# Patient Record
Sex: MALE | Race: WHITE | NOT HISPANIC OR LATINO | URBAN - METROPOLITAN AREA
[De-identification: names, ages, dates, MRNs, and addresses within clinical notes are randomized per-mention and may not be internally consistent; named-entity substitution may affect disease eponyms.]

---

## 2020-06-24 ENCOUNTER — INPATIENT (INPATIENT)
Facility: HOSPITAL | Age: 85
LOS: 0 days | Discharge: HOME | DRG: 312 | End: 2020-06-25
Attending: INTERNAL MEDICINE | Admitting: INTERNAL MEDICINE
Payer: MEDICARE

## 2020-06-24 VITALS
TEMPERATURE: 98 F | DIASTOLIC BLOOD PRESSURE: 83 MMHG | HEIGHT: 71 IN | HEART RATE: 60 BPM | OXYGEN SATURATION: 97 % | RESPIRATION RATE: 18 BRPM | WEIGHT: 169.98 LBS | SYSTOLIC BLOOD PRESSURE: 188 MMHG

## 2020-06-24 DIAGNOSIS — E03.9 HYPOTHYROIDISM, UNSPECIFIED: ICD-10-CM

## 2020-06-24 DIAGNOSIS — Z02.9 ENCOUNTER FOR ADMINISTRATIVE EXAMINATIONS, UNSPECIFIED: ICD-10-CM

## 2020-06-24 DIAGNOSIS — I45.10 UNSPECIFIED RIGHT BUNDLE-BRANCH BLOCK: ICD-10-CM

## 2020-06-24 DIAGNOSIS — I48.0 PAROXYSMAL ATRIAL FIBRILLATION: ICD-10-CM

## 2020-06-24 DIAGNOSIS — I10 ESSENTIAL (PRIMARY) HYPERTENSION: ICD-10-CM

## 2020-06-24 DIAGNOSIS — Z96.7 PRESENCE OF OTHER BONE AND TENDON IMPLANTS: Chronic | ICD-10-CM

## 2020-06-24 DIAGNOSIS — R55 SYNCOPE AND COLLAPSE: ICD-10-CM

## 2020-06-24 DIAGNOSIS — I50.9 HEART FAILURE, UNSPECIFIED: ICD-10-CM

## 2020-06-24 DIAGNOSIS — D50.9 IRON DEFICIENCY ANEMIA, UNSPECIFIED: ICD-10-CM

## 2020-06-24 LAB
ALBUMIN SERPL ELPH-MCNC: 3.9 G/DL — SIGNIFICANT CHANGE UP (ref 3.3–5)
ALP SERPL-CCNC: 82 U/L — SIGNIFICANT CHANGE UP (ref 40–120)
ALT FLD-CCNC: 21 U/L — SIGNIFICANT CHANGE UP (ref 10–45)
ANION GAP SERPL CALC-SCNC: 14 MMOL/L — SIGNIFICANT CHANGE UP (ref 5–17)
APPEARANCE UR: CLEAR — SIGNIFICANT CHANGE UP
APTT BLD: 44.5 SEC — HIGH (ref 27.5–36.3)
AST SERPL-CCNC: 27 U/L — SIGNIFICANT CHANGE UP (ref 10–40)
BASOPHILS # BLD AUTO: 0.04 K/UL — SIGNIFICANT CHANGE UP (ref 0–0.2)
BASOPHILS NFR BLD AUTO: 0.4 % — SIGNIFICANT CHANGE UP (ref 0–2)
BILIRUB SERPL-MCNC: 0.3 MG/DL — SIGNIFICANT CHANGE UP (ref 0.2–1.2)
BILIRUB UR-MCNC: NEGATIVE — SIGNIFICANT CHANGE UP
BUN SERPL-MCNC: 27 MG/DL — HIGH (ref 7–23)
CALCIUM SERPL-MCNC: 9.5 MG/DL — SIGNIFICANT CHANGE UP (ref 8.4–10.5)
CHLORIDE SERPL-SCNC: 100 MMOL/L — SIGNIFICANT CHANGE UP (ref 96–108)
CO2 SERPL-SCNC: 22 MMOL/L — SIGNIFICANT CHANGE UP (ref 22–31)
COLOR SPEC: SIGNIFICANT CHANGE UP
CREAT SERPL-MCNC: 1.36 MG/DL — HIGH (ref 0.5–1.3)
DIFF PNL FLD: NEGATIVE — SIGNIFICANT CHANGE UP
EOSINOPHIL # BLD AUTO: 0.31 K/UL — SIGNIFICANT CHANGE UP (ref 0–0.5)
EOSINOPHIL NFR BLD AUTO: 3.1 % — SIGNIFICANT CHANGE UP (ref 0–6)
GLUCOSE SERPL-MCNC: 99 MG/DL — SIGNIFICANT CHANGE UP (ref 70–99)
GLUCOSE UR QL: NEGATIVE — SIGNIFICANT CHANGE UP
HCT VFR BLD CALC: 40.4 % — SIGNIFICANT CHANGE UP (ref 39–50)
HGB BLD-MCNC: 13.4 G/DL — SIGNIFICANT CHANGE UP (ref 13–17)
IMM GRANULOCYTES NFR BLD AUTO: 0.7 % — SIGNIFICANT CHANGE UP (ref 0–1.5)
INR BLD: 1.05 RATIO — SIGNIFICANT CHANGE UP (ref 0.88–1.16)
KETONES UR-MCNC: NEGATIVE — SIGNIFICANT CHANGE UP
LACTATE SERPL-SCNC: 2.3 MMOL/L — HIGH (ref 0.7–2)
LEUKOCYTE ESTERASE UR-ACNC: NEGATIVE — SIGNIFICANT CHANGE UP
LYMPHOCYTES # BLD AUTO: 2.27 K/UL — SIGNIFICANT CHANGE UP (ref 1–3.3)
LYMPHOCYTES # BLD AUTO: 23 % — SIGNIFICANT CHANGE UP (ref 13–44)
MAGNESIUM SERPL-MCNC: 2 MG/DL — SIGNIFICANT CHANGE UP (ref 1.6–2.6)
MCHC RBC-ENTMCNC: 30.7 PG — SIGNIFICANT CHANGE UP (ref 27–34)
MCHC RBC-ENTMCNC: 33.2 GM/DL — SIGNIFICANT CHANGE UP (ref 32–36)
MCV RBC AUTO: 92.4 FL — SIGNIFICANT CHANGE UP (ref 80–100)
MONOCYTES # BLD AUTO: 1.01 K/UL — HIGH (ref 0–0.9)
MONOCYTES NFR BLD AUTO: 10.2 % — SIGNIFICANT CHANGE UP (ref 2–14)
NEUTROPHILS # BLD AUTO: 6.19 K/UL — SIGNIFICANT CHANGE UP (ref 1.8–7.4)
NEUTROPHILS NFR BLD AUTO: 62.6 % — SIGNIFICANT CHANGE UP (ref 43–77)
NITRITE UR-MCNC: NEGATIVE — SIGNIFICANT CHANGE UP
NRBC # BLD: 0 /100 WBCS — SIGNIFICANT CHANGE UP (ref 0–0)
NT-PROBNP SERPL-SCNC: 1625 PG/ML — HIGH (ref 0–300)
PH UR: 6.5 — SIGNIFICANT CHANGE UP (ref 5–8)
PHOSPHATE SERPL-MCNC: 3.8 MG/DL — SIGNIFICANT CHANGE UP (ref 2.5–4.5)
PLATELET # BLD AUTO: 235 K/UL — SIGNIFICANT CHANGE UP (ref 150–400)
POTASSIUM SERPL-MCNC: 3.6 MMOL/L — SIGNIFICANT CHANGE UP (ref 3.5–5.3)
POTASSIUM SERPL-SCNC: 3.6 MMOL/L — SIGNIFICANT CHANGE UP (ref 3.5–5.3)
PROT SERPL-MCNC: 7 G/DL — SIGNIFICANT CHANGE UP (ref 6–8.3)
PROT UR-MCNC: ABNORMAL
PROTHROM AB SERPL-ACNC: 12.1 SEC — SIGNIFICANT CHANGE UP (ref 10–12.9)
RBC # BLD: 4.37 M/UL — SIGNIFICANT CHANGE UP (ref 4.2–5.8)
RBC # FLD: 14.6 % — HIGH (ref 10.3–14.5)
SARS-COV-2 RNA SPEC QL NAA+PROBE: SIGNIFICANT CHANGE UP
SODIUM SERPL-SCNC: 136 MMOL/L — SIGNIFICANT CHANGE UP (ref 135–145)
SP GR SPEC: 1.01 — SIGNIFICANT CHANGE UP (ref 1.01–1.02)
TROPONIN T, HIGH SENSITIVITY RESULT: 39 NG/L — SIGNIFICANT CHANGE UP (ref 0–51)
TROPONIN T, HIGH SENSITIVITY RESULT: 39 NG/L — SIGNIFICANT CHANGE UP (ref 0–51)
UROBILINOGEN FLD QL: NEGATIVE — SIGNIFICANT CHANGE UP
WBC # BLD: 9.89 K/UL — SIGNIFICANT CHANGE UP (ref 3.8–10.5)
WBC # FLD AUTO: 9.89 K/UL — SIGNIFICANT CHANGE UP (ref 3.8–10.5)

## 2020-06-24 PROCEDURE — 71045 X-RAY EXAM CHEST 1 VIEW: CPT | Mod: 26

## 2020-06-24 PROCEDURE — 72170 X-RAY EXAM OF PELVIS: CPT | Mod: 26

## 2020-06-24 PROCEDURE — 99358 PROLONG SERVICE W/O CONTACT: CPT

## 2020-06-24 PROCEDURE — 70450 CT HEAD/BRAIN W/O DYE: CPT | Mod: 26

## 2020-06-24 PROCEDURE — 99285 EMERGENCY DEPT VISIT HI MDM: CPT | Mod: CS,25,GC

## 2020-06-24 PROCEDURE — 93010 ELECTROCARDIOGRAM REPORT: CPT | Mod: 59,GC

## 2020-06-24 PROCEDURE — 12002 RPR S/N/AX/GEN/TRNK2.6-7.5CM: CPT | Mod: GC

## 2020-06-24 PROCEDURE — 99223 1ST HOSP IP/OBS HIGH 75: CPT

## 2020-06-24 RX ORDER — TETANUS TOXOID, REDUCED DIPHTHERIA TOXOID AND ACELLULAR PERTUSSIS VACCINE, ADSORBED 5; 2.5; 8; 8; 2.5 [IU]/.5ML; [IU]/.5ML; UG/.5ML; UG/.5ML; UG/.5ML
0.5 SUSPENSION INTRAMUSCULAR ONCE
Refills: 0 | Status: COMPLETED | OUTPATIENT
Start: 2020-06-24 | End: 2020-06-24

## 2020-06-24 RX ORDER — PANTOPRAZOLE SODIUM 20 MG/1
40 TABLET, DELAYED RELEASE ORAL
Refills: 0 | Status: DISCONTINUED | OUTPATIENT
Start: 2020-06-25 | End: 2020-06-25

## 2020-06-24 RX ORDER — SIMVASTATIN 20 MG/1
1 TABLET, FILM COATED ORAL
Qty: 0 | Refills: 0 | DISCHARGE

## 2020-06-24 RX ORDER — PANTOPRAZOLE SODIUM 20 MG/1
1 TABLET, DELAYED RELEASE ORAL
Qty: 0 | Refills: 0 | DISCHARGE

## 2020-06-24 RX ORDER — FUROSEMIDE 40 MG
40 TABLET ORAL DAILY
Refills: 0 | Status: DISCONTINUED | OUTPATIENT
Start: 2020-06-25 | End: 2020-06-25

## 2020-06-24 RX ORDER — FERROUS SULFATE 325(65) MG
1 TABLET ORAL
Qty: 0 | Refills: 0 | DISCHARGE

## 2020-06-24 RX ORDER — DIGOXIN 250 MCG
0.12 TABLET ORAL DAILY
Refills: 0 | Status: DISCONTINUED | OUTPATIENT
Start: 2020-06-25 | End: 2020-06-25

## 2020-06-24 RX ORDER — SERTRALINE 25 MG/1
1 TABLET, FILM COATED ORAL
Qty: 0 | Refills: 0 | DISCHARGE

## 2020-06-24 RX ORDER — SERTRALINE 25 MG/1
100 TABLET, FILM COATED ORAL DAILY
Refills: 0 | Status: DISCONTINUED | OUTPATIENT
Start: 2020-06-24 | End: 2020-06-25

## 2020-06-24 RX ORDER — ISOSORBIDE MONONITRATE 60 MG/1
60 TABLET, EXTENDED RELEASE ORAL DAILY
Refills: 0 | Status: DISCONTINUED | OUTPATIENT
Start: 2020-06-25 | End: 2020-06-25

## 2020-06-24 RX ORDER — DILTIAZEM HCL 120 MG
120 CAPSULE, EXT RELEASE 24 HR ORAL DAILY
Refills: 0 | Status: DISCONTINUED | OUTPATIENT
Start: 2020-06-25 | End: 2020-06-25

## 2020-06-24 RX ORDER — LIDOCAINE/EPINEPHR/TETRACAINE 4-0.09-0.5
1 GEL WITH PREFILLED APPLICATOR (ML) TOPICAL ONCE
Refills: 0 | Status: COMPLETED | OUTPATIENT
Start: 2020-06-24 | End: 2020-06-24

## 2020-06-24 RX ORDER — FERROUS SULFATE 325(65) MG
325 TABLET ORAL DAILY
Refills: 0 | Status: DISCONTINUED | OUTPATIENT
Start: 2020-06-24 | End: 2020-06-25

## 2020-06-24 RX ORDER — SIMVASTATIN 20 MG/1
10 TABLET, FILM COATED ORAL AT BEDTIME
Refills: 0 | Status: DISCONTINUED | OUTPATIENT
Start: 2020-06-24 | End: 2020-06-25

## 2020-06-24 RX ORDER — HYDRALAZINE HCL 50 MG
50 TABLET ORAL
Refills: 0 | Status: DISCONTINUED | OUTPATIENT
Start: 2020-06-24 | End: 2020-06-25

## 2020-06-24 RX ORDER — LEVOTHYROXINE SODIUM 125 MCG
100 TABLET ORAL DAILY
Refills: 0 | Status: DISCONTINUED | OUTPATIENT
Start: 2020-06-25 | End: 2020-06-25

## 2020-06-24 RX ORDER — LEVOTHYROXINE SODIUM 125 MCG
1 TABLET ORAL
Qty: 0 | Refills: 0 | DISCHARGE

## 2020-06-24 RX ORDER — IRBESARTAN 75 MG/1
1 TABLET ORAL
Qty: 0 | Refills: 0 | DISCHARGE

## 2020-06-24 RX ORDER — DABIGATRAN ETEXILATE MESYLATE 150 MG/1
1 CAPSULE ORAL
Qty: 0 | Refills: 0 | DISCHARGE

## 2020-06-24 RX ORDER — LOSARTAN POTASSIUM 100 MG/1
100 TABLET, FILM COATED ORAL DAILY
Refills: 0 | Status: DISCONTINUED | OUTPATIENT
Start: 2020-06-25 | End: 2020-06-25

## 2020-06-24 RX ADMIN — Medication 50 MILLIGRAM(S): at 22:47

## 2020-06-24 RX ADMIN — TETANUS TOXOID, REDUCED DIPHTHERIA TOXOID AND ACELLULAR PERTUSSIS VACCINE, ADSORBED 0.5 MILLILITER(S): 5; 2.5; 8; 8; 2.5 SUSPENSION INTRAMUSCULAR at 21:18

## 2020-06-24 RX ADMIN — Medication 1 APPLICATION(S): at 21:18

## 2020-06-24 NOTE — ED PROVIDER NOTE - PHYSICAL EXAMINATION
General: well appearing, interactive, well nourished, NAD  HEENT: pupils equal and reactive, normal external ears bilaterally   Cardiac: RRR, no MRG appreciated  Scalp: Posterior scalp lac 5x5 cm, galea intact  Resp: lungs clear to auscultation bilaterally, symmetric chest wall rise  Abd: soft, nontender, nondistended,   : no CVA tenderness  Neuro: Moving all extremities, 5/5 str in UE and LE   Skin:  normal color for race  MSK: no tenderness with full active and passive ROM of BL shoulders/elbows/knees/hips/ankles,

## 2020-06-24 NOTE — ED PROVIDER NOTE - NS ED ROS FT
CONSTITUTIONAL: No fevers, no chills  Eyes: No vision changes  Cardiovascular: No Chest pain  Respiratory: No SOB  Gastrointestinal: No n/v/d, no abd pain  Genitourinary: no dysuria, no hematuria  SKIN: no rashes.  NEURO: no weakness or numbness  PSYCHIATRIC: no known mental health issues, alert and oriented x 3 to self/place/time

## 2020-06-24 NOTE — H&P ADULT - PROBLEM SELECTOR PLAN 3
c/w tele monitoring  c/w digoxin and diltiazem home dosing  HOLD dabigatran overnight given scalp hematoma. Consideration to resume tomorrow or next day per day hospitalist.

## 2020-06-24 NOTE — H&P ADULT - PROBLEM SELECTOR PLAN 1
- CT head per rads without acute ICH  - troponin delta not c/w ACS  - EKG with RBBB- unclear if this is new and will need day hospitalist to f/u  - BNP elevated but on exam not features of acute decompensated heart failure  - scalp lac repair per ED  - tele monitoring, TTE in am to evaluate for valvulopathy and EF given HF  - unclear if labile BP reported is contributory  - no acute hemorrhage on exam appreciated  - no acute paralysis appreciated to suggest CVA and CT head per rads without acute pathology detected  - PT ordered

## 2020-06-24 NOTE — H&P ADULT - ATTENDING COMMENTS
Patient assigned to me by night hospitalist in charge for management and care for patient for this evening only. Care to be resumed by day hospitalist Dr. Jonathan Anthony at 08:00 in the morning and thereafter.     Jesse Ulloa MD  Medicine Attending  Department of Hospital Medicine  pager: [phone] (available from 20:00 to 08:00) Patient assigned to me by night hospitalist in charge for management and care for patient for this evening only. Care to be resumed by day hospitalist Dr. Jonathan Anthony at 08:00 in the morning and thereafter.     I personally provided 35 minutes of prolonged services without face-to-face contact on 6/24/20 from 09:30PM to 10:05PM. This was medically required due to syncope and patient being unable to provide full history regarding his fall, his full pmh and medications which required detailed chart review available on EMR and conversation with his daughter Ms. Thais Ward. Ms. aWrd lives with the patient and was present at time of fall however did not witness the event but shortly after was with patient. She requests that she be contacted regarding her fathers care and indicates that she is the point person for the family should the medical team need to reach her. She is very knowledgeable of the patient medical history and provided.    Billing code: [id number]    Jesse Ulloa MD  Medicine Attending  Department of Hospital Medicine  pager: [phone] (available from 20:00 to 08:00)

## 2020-06-24 NOTE — H&P ADULT - PROBLEM SELECTOR PLAN 2
- c/w home furosemide, hydralazine, irbesartan, isosorbide mononitrate  - TTE ordered  - Tele monitoring  - ordered TSH, lactate  - Troponin delta not c/w ACS and NO CP reported by patient

## 2020-06-24 NOTE — H&P ADULT - PROBLEM SELECTOR PLAN 6
c/w iron supplementation  no anemia on admit labs and therefore successfully corrected by outpatient supplementation

## 2020-06-24 NOTE — ED PROVIDER NOTE - OBJECTIVE STATEMENT
89yo M pmhx afib on Pradexa, CAD here w/ CC Syncope    Pt fell while using walker, state "just went down" and does not know why, states has had happened 4 times in the past two months. Does not know why he takes furosemide or digoxin, states on hx of stents. Denies CP/SOB. Denies pain to area other then scalp. No N/V/D, no headache.     Unknown last Tetanus 89yo M pmhx afib on Pradexa, CAD here w/ CC Syncope    Pt fell while using walker, state "just went down" and does not know why, states has had happened 4 times in the past two months. Does not know why he takes furosemide or digoxin, states on hx of stents. Denies CP/SOB. Denies pain to area other then scalp. No N/V/D, no headache.     Social: Lives with wife at home  Unknown last Tetanus 89yo M pmhx afib on Pradexa, CHF, CAD here w/ CC Syncope    Pt fell while using walker, state "just went down" and does not know why, states has had happened 4 times in the past two months. Does not know why he takes furosemide or digoxin, states on hx of stents. Denies CP/SOB. Denies pain to area other then scalp. No N/V/D, no headache.     Social: Lives with wife at home  Unknown last Tetanus

## 2020-06-24 NOTE — ED PROVIDER NOTE - PROGRESS NOTE DETAILS
[facility] PGY-2:  D/W PCP Dr. Raj Castillo (# [phone]), states pt has pmhx of atrial bradycardia, CHF, afib , agrees with admission.   At baseline has ST depressions throughout pre-cordium, RBBB, AVR elevation, tachy-bradia syndrome.  Will admit pt to self

## 2020-06-24 NOTE — H&P ADULT - NSHPPHYSICALEXAM_GEN_ALL_CORE
Vital Signs Last 24 Hrs  T(C): 36.9 (24 Jun 2020 22:09), Max: 36.9 (24 Jun 2020 19:50)  T(F): 98.4 (24 Jun 2020 22:09), Max: 98.4 (24 Jun 2020 19:50)  HR: 67 (24 Jun 2020 22:09) (60 - 67)  BP: 196/86 (24 Jun 2020 22:09) (188/83 - 196/86)  RR: 18 (24 Jun 2020 22:09) (18 - 18)  SpO2: 100% (24 Jun 2020 22:09) (97% - 100%) on RA    GENERAL: NAD, well-developed  HEAD:  laceration on back of hear, Normocephalic  EYES: EOMI, PERRL, conjunctiva and sclera clear  Mouth: MMM, no lesions  NECK: Supple, no appreciable masses  Lung: normal work of breathing, cta b/l  Chest: S1&S2+, rrr, no m/r/g appreciated  ABDOMEN: bs+, soft, nt, nd, no appreciable masses  : No conn catheter, no CVA tenderness  EXTREMITIES:  radial pulse present b/l, PT present b/l, no pitting edema present  Neuro: A&Ox3, no paralysis in extremities appreciated  SKIN: warm and dry, no visible purulence in exposed areas

## 2020-06-24 NOTE — ED ADULT NURSE NOTE - OBJECTIVE STATEMENT
89 year old male with a PMH afib on pradaxa, CHF and CAD comes to the ED s/p fall/syncope a/w head injury/potential LOC. Patient states he was walking in home with assistance of his walker when he had fallen back wards. Patient unsure how he fell and states, "next thing I remember was my daughter helping me up." Per EMS, this is the 5th fall patient has had within the last 4 months. Patient presents to the ED a/ox3, hypertensive, other VSS, sensory/motor function intact, speaking coherently, follows commands and in NAD at this time. Patient p/w laceration to the posterior aspect of scalp, with minimal bleeding present. Lung sounds are clear and equal b/l with no labored breathing noted. Abdomen is soft, nontender and nondistended. Peripheral pulses are strong and equal b/l with no edema noted. Skin is warm, dry and intact. Patient denies CP/SOB, f/c, n/v/d, dizziness/lightheadedness, numbness/tingling/weakness, abdominal pain/back pain, heamturia/dysuria/frequency at this time. Patient placed on CM p/w NSR, EKG performed, 20G IV placed in left forearm, labs sent. MD Jackson seen at patient's bedside for evaluation.

## 2020-06-24 NOTE — H&P ADULT - PROBLEM SELECTOR PLAN 8
Transitions of Care Status:  1.  Name of PCP: Dr. Anthony  2.  PCP Contacted on Admission: [ ] Y    [ ] N    3.  PCP contacted at Discharge: [ ] Y    [ ] N    [ ] N/A  4.  Post-Discharge Appointment Date and Location:  5.  Summary of Handoff given to PCP:

## 2020-06-24 NOTE — H&P ADULT - NSICDXPASTMEDICALHX_GEN_ALL_CORE_FT
PAST MEDICAL HISTORY:  Atrial fibrillation     Benign Essential Hypertension     CAD (coronary artery disease)     Heart failure

## 2020-06-24 NOTE — ED PROVIDER NOTE - ATTENDING CONTRIBUTION TO CARE
I performed a history and physical exam of the patient and discussed their management with the resident.  I reviewed the resident's note and agree with the documented findings and plan of care except as noted below. My medical decision making and observations are as follows:    89yo M pmhx afib on Pradexa, CAD BIBEMS after fall. Pt fell while using walker, state "just went down" and does not know why, states has had happened 4 times in the past two months.  reports minimal pain to scalp, denies any other pain.  No chest pain, shortness of breath, dizziness prior to fall.  Pt A&O x 3, heart rrr, lungs cta, no midline vertebral ttp, v-shaped laceration to left occiput, no neuro deficits, Full ROM all extremities.  Concern for possible intracranial bleed given head trauma and pt's pradaxa use.  Also concerning hx of more frequent falls - unclear if these are syncopal episodes or mechanical in nature.  Pt needs labs, CTs, xrays, and likely admission for syncope work up. Fusiform Excision Additional Text (Leave Blank If You Do Not Want): The margin was drawn around the clinically apparent lesion.  A fusiform shape was then drawn on the skin incorporating the lesion and margins.  Incisions were then made along these lines to the appropriate tissue plane and the lesion was extirpated.

## 2020-06-24 NOTE — H&P ADULT - NSHPLABSRESULTS_GEN_ALL_CORE
Personally reviewed available labs, imaging and ekg  CBC Full  -  ( 24 Jun 2020 20:29 )  WBC Count : 9.89 K/uL  RBC Count : 4.37 M/uL  Hemoglobin : 13.4 g/dL  Hematocrit : 40.4 %  Platelet Count - Automated : 235 K/uL  Mean Cell Volume : 92.4 fl  Mean Cell Hemoglobin : 30.7 pg  Mean Cell Hemoglobin Concentration : 33.2 gm/dL  Auto Neutrophil # : 6.19 K/uL  Auto Lymphocyte # : 2.27 K/uL  Auto Monocyte # : 1.01 K/uL  Auto Eosinophil # : 0.31 K/uL  Auto Basophil # : 0.04 K/uL  Auto Neutrophil % : 62.6 %  Auto Lymphocyte % : 23.0 %  Auto Monocyte % : 10.2 %  Auto Eosinophil % : 3.1 %  Auto Basophil % : 0.4 %  06-24  136  |  100  |  27<H>  ----------------------------<  99  3.6   |  22  |  1.36<H>  Ca    9.5      24 Jun 2020 20:29  Phos  3.8     06-24  Mg     2.0     06-24  TPro  7.0  /  Alb  3.9  /  TBili  0.3  /  DBili  x   /  AST  27  /  ALT  21  /  AlkPhos  82  06-24  PT/INR - ( 24 Jun 2020 20:29 )   PT: 12.1 sec;   INR: 1.05 ratio    PTT - ( 24 Jun 2020 20:29 )  PTT:44.5 sec  Troponin T, High Sensitivity Result: 39:  (06.24.20 @ 20:29)  Troponin T, High Sensitivity Result: 39:  (06.24.20 @ 21:49)  Serum Pro-Brain Natriuretic Peptide: 1625 pg/mL (06.24.20 @ 20:29)    Imaging: CXR does not demonstrate lobar opacification  CT head does not demonstrate large territorial acute intracranial hemorrhage, scalp hematoma  EKG: sinus bradycardia 59, RBBB, NO STEMI appreciated

## 2020-06-24 NOTE — H&P ADULT - NSHPREVIEWOFSYSTEMS_GEN_ALL_CORE
CONSTITUTIONAL: No fever, no chills  EYES: No eye pain, no acute blindness  Mouth: no pain in mouth, no cuts  RESPIRATORY: No cough, No sob  CARDIOVASCULAR: No CP, no palpitations  GASTROINTESTINAL: no abdominal pain, no n/v/d  GENITOURINARY: No dysuria, no hematuria  Heme: No easy bruising, no swelling of neck  NEUROLOGICAL: No seizure, No acute paralysis, fall+  SKIN: No itching, no rashes  MUSCULOSKELETAL: No acute joint pain, no joint swelling

## 2020-06-24 NOTE — H&P ADULT - HISTORY OF PRESENT ILLNESS
patient evaluated at bedside. syncope eval. full H&P eval 89M w/ HF, CAD, afib on pradaxa, HTN, iron deficiency anemia presents to [facility] for evaluation following fall. Patient reports a fall while walking with walker to sit down in next room for dinner but he cannot remember the mechanism. When he fell he hit his head on carpeted stairs and had bleeding from the back of his head but denies LOC, weakness or paralysis in extremities, seizure or tongue biting. He further denies CP, palpitations, sob, cough, incontinence following fall, fever, chills, or poor po intake. Per conversation with his daughter Ms. Thais Ward, the fall was not witnessed but she was in the next room and heard a "thump" and immediately went to see him and said he did not appear to have LOC, seizure, or acute distress and called EMS due to the head bleeding. She further reports that he maintains all ADL and is generally well but over the last 4mo or so has had 5 falls including the current episode and believes he is generally becoming weaker and his legs are not able to keep him up (concerned that this maybe related to his heart failure but denies lower extremity swelling or CP/SOB or weight changes). Of note he also has been recently treated for iron deficiency anemia over the last few months and had recent EGD within 6mo which was normal and colonoscopy 2 yr prior which was also reported as normal. Last, she points out that he has had labile blood pressure for some time and they often have to call his cardiologist to determine when additional hydralazine dosing should be provided.    In the ED, VS 98.4, 188/83, 60, 18 97%RA  s/p Adacel vaccine

## 2020-06-24 NOTE — ED ADULT TRIAGE NOTE - BP NONINVASIVE SYSTOLIC (MM HG)
[facility] 188

## 2020-06-24 NOTE — H&P ADULT - ASSESSMENT
89M w/ HF, CAD, afib on pradaxa, HTN, iron deficiency anemia presents to [facility] for evaluation following fall requiring admit for syncope evaluation.

## 2020-06-25 ENCOUNTER — TRANSCRIPTION ENCOUNTER (OUTPATIENT)
Age: 85
End: 2020-06-25

## 2020-06-25 VITALS — HEART RATE: 76 BPM | SYSTOLIC BLOOD PRESSURE: 148 MMHG | DIASTOLIC BLOOD PRESSURE: 80 MMHG

## 2020-06-25 LAB
ANION GAP SERPL CALC-SCNC: 11 MMOL/L — SIGNIFICANT CHANGE UP (ref 5–17)
APTT BLD: 31.6 SEC — SIGNIFICANT CHANGE UP (ref 27.5–36.3)
BUN SERPL-MCNC: 22 MG/DL — SIGNIFICANT CHANGE UP (ref 7–23)
CALCIUM SERPL-MCNC: 8.6 MG/DL — SIGNIFICANT CHANGE UP (ref 8.4–10.5)
CHLORIDE SERPL-SCNC: 101 MMOL/L — SIGNIFICANT CHANGE UP (ref 96–108)
CO2 SERPL-SCNC: 25 MMOL/L — SIGNIFICANT CHANGE UP (ref 22–31)
CREAT SERPL-MCNC: 1.31 MG/DL — HIGH (ref 0.5–1.3)
DIGOXIN SERPL-MCNC: 1.7 NG/ML — SIGNIFICANT CHANGE UP (ref 0.8–2)
FERRITIN SERPL-MCNC: 69 NG/ML — SIGNIFICANT CHANGE UP (ref 30–400)
FOLATE SERPL-MCNC: 9.1 NG/ML — SIGNIFICANT CHANGE UP
GLUCOSE SERPL-MCNC: 105 MG/DL — HIGH (ref 70–99)
HCT VFR BLD CALC: 36.7 % — LOW (ref 39–50)
HGB BLD-MCNC: 12.2 G/DL — LOW (ref 13–17)
INR BLD: 1.11 RATIO — SIGNIFICANT CHANGE UP (ref 0.88–1.16)
IRON SATN MFR SERPL: 31 % — SIGNIFICANT CHANGE UP (ref 16–55)
IRON SATN MFR SERPL: 75 UG/DL — SIGNIFICANT CHANGE UP (ref 45–165)
LACTATE SERPL-SCNC: 0.8 MMOL/L — SIGNIFICANT CHANGE UP (ref 0.7–2)
MAGNESIUM SERPL-MCNC: 1.8 MG/DL — SIGNIFICANT CHANGE UP (ref 1.6–2.6)
MCHC RBC-ENTMCNC: 30.9 PG — SIGNIFICANT CHANGE UP (ref 27–34)
MCHC RBC-ENTMCNC: 33.2 GM/DL — SIGNIFICANT CHANGE UP (ref 32–36)
MCV RBC AUTO: 92.9 FL — SIGNIFICANT CHANGE UP (ref 80–100)
NRBC # BLD: 0 /100 WBCS — SIGNIFICANT CHANGE UP (ref 0–0)
PHOSPHATE SERPL-MCNC: 3.7 MG/DL — SIGNIFICANT CHANGE UP (ref 2.5–4.5)
PLATELET # BLD AUTO: 202 K/UL — SIGNIFICANT CHANGE UP (ref 150–400)
POTASSIUM SERPL-MCNC: 3.9 MMOL/L — SIGNIFICANT CHANGE UP (ref 3.5–5.3)
POTASSIUM SERPL-SCNC: 3.9 MMOL/L — SIGNIFICANT CHANGE UP (ref 3.5–5.3)
PROTHROM AB SERPL-ACNC: 12.8 SEC — SIGNIFICANT CHANGE UP (ref 10–12.9)
RBC # BLD: 3.95 M/UL — LOW (ref 4.2–5.8)
RBC # FLD: 14.8 % — HIGH (ref 10.3–14.5)
SODIUM SERPL-SCNC: 137 MMOL/L — SIGNIFICANT CHANGE UP (ref 135–145)
TIBC SERPL-MCNC: 241 UG/DL — SIGNIFICANT CHANGE UP (ref 220–430)
TSH SERPL-MCNC: 3.87 UIU/ML — SIGNIFICANT CHANGE UP (ref 0.27–4.2)
UIBC SERPL-MCNC: 166 UG/DL — SIGNIFICANT CHANGE UP (ref 110–370)
VIT B12 SERPL-MCNC: 308 PG/ML — SIGNIFICANT CHANGE UP (ref 232–1245)
WBC # BLD: 10.28 K/UL — SIGNIFICANT CHANGE UP (ref 3.8–10.5)
WBC # FLD AUTO: 10.28 K/UL — SIGNIFICANT CHANGE UP (ref 3.8–10.5)

## 2020-06-25 PROCEDURE — 83540 ASSAY OF IRON: CPT

## 2020-06-25 PROCEDURE — 83550 IRON BINDING TEST: CPT

## 2020-06-25 PROCEDURE — 82746 ASSAY OF FOLIC ACID SERUM: CPT

## 2020-06-25 PROCEDURE — 82728 ASSAY OF FERRITIN: CPT

## 2020-06-25 PROCEDURE — 84443 ASSAY THYROID STIM HORMONE: CPT

## 2020-06-25 PROCEDURE — 80048 BASIC METABOLIC PNL TOTAL CA: CPT

## 2020-06-25 PROCEDURE — 99285 EMERGENCY DEPT VISIT HI MDM: CPT | Mod: 25

## 2020-06-25 PROCEDURE — 83605 ASSAY OF LACTIC ACID: CPT

## 2020-06-25 PROCEDURE — 80053 COMPREHEN METABOLIC PANEL: CPT

## 2020-06-25 PROCEDURE — 83735 ASSAY OF MAGNESIUM: CPT

## 2020-06-25 PROCEDURE — 87086 URINE CULTURE/COLONY COUNT: CPT

## 2020-06-25 PROCEDURE — 90715 TDAP VACCINE 7 YRS/> IM: CPT

## 2020-06-25 PROCEDURE — 93880 EXTRACRANIAL BILAT STUDY: CPT

## 2020-06-25 PROCEDURE — 80162 ASSAY OF DIGOXIN TOTAL: CPT

## 2020-06-25 PROCEDURE — 93306 TTE W/DOPPLER COMPLETE: CPT

## 2020-06-25 PROCEDURE — 81001 URINALYSIS AUTO W/SCOPE: CPT

## 2020-06-25 PROCEDURE — 36415 COLL VENOUS BLD VENIPUNCTURE: CPT

## 2020-06-25 PROCEDURE — 93306 TTE W/DOPPLER COMPLETE: CPT | Mod: 26

## 2020-06-25 PROCEDURE — 97162 PT EVAL MOD COMPLEX 30 MIN: CPT

## 2020-06-25 PROCEDURE — 87186 SC STD MICRODIL/AGAR DIL: CPT

## 2020-06-25 PROCEDURE — 85610 PROTHROMBIN TIME: CPT

## 2020-06-25 PROCEDURE — 71045 X-RAY EXAM CHEST 1 VIEW: CPT

## 2020-06-25 PROCEDURE — 83880 ASSAY OF NATRIURETIC PEPTIDE: CPT

## 2020-06-25 PROCEDURE — 93005 ELECTROCARDIOGRAM TRACING: CPT | Mod: XU

## 2020-06-25 PROCEDURE — 72170 X-RAY EXAM OF PELVIS: CPT

## 2020-06-25 PROCEDURE — 84100 ASSAY OF PHOSPHORUS: CPT

## 2020-06-25 PROCEDURE — 85027 COMPLETE CBC AUTOMATED: CPT

## 2020-06-25 PROCEDURE — 84484 ASSAY OF TROPONIN QUANT: CPT

## 2020-06-25 PROCEDURE — 93880 EXTRACRANIAL BILAT STUDY: CPT | Mod: 26

## 2020-06-25 PROCEDURE — 82607 VITAMIN B-12: CPT

## 2020-06-25 PROCEDURE — 12002 RPR S/N/AX/GEN/TRNK2.6-7.5CM: CPT

## 2020-06-25 PROCEDURE — 70450 CT HEAD/BRAIN W/O DYE: CPT

## 2020-06-25 PROCEDURE — 85730 THROMBOPLASTIN TIME PARTIAL: CPT

## 2020-06-25 RX ORDER — ZOLPIDEM TARTRATE 10 MG/1
1 TABLET ORAL
Qty: 0 | Refills: 0 | DISCHARGE

## 2020-06-25 RX ORDER — FUROSEMIDE 40 MG
1 TABLET ORAL
Qty: 0 | Refills: 0 | DISCHARGE

## 2020-06-25 RX ADMIN — PANTOPRAZOLE SODIUM 40 MILLIGRAM(S): 20 TABLET, DELAYED RELEASE ORAL at 06:07

## 2020-06-25 RX ADMIN — ISOSORBIDE MONONITRATE 60 MILLIGRAM(S): 60 TABLET, EXTENDED RELEASE ORAL at 12:55

## 2020-06-25 RX ADMIN — LOSARTAN POTASSIUM 100 MILLIGRAM(S): 100 TABLET, FILM COATED ORAL at 00:57

## 2020-06-25 RX ADMIN — Medication 0.12 MILLIGRAM(S): at 06:08

## 2020-06-25 RX ADMIN — Medication 50 MILLIGRAM(S): at 06:07

## 2020-06-25 RX ADMIN — Medication 100 MICROGRAM(S): at 06:07

## 2020-06-25 RX ADMIN — Medication 40 MILLIGRAM(S): at 06:08

## 2020-06-25 RX ADMIN — SERTRALINE 100 MILLIGRAM(S): 25 TABLET, FILM COATED ORAL at 12:55

## 2020-06-25 RX ADMIN — Medication 50 MILLIGRAM(S): at 12:55

## 2020-06-25 RX ADMIN — Medication 50 MILLIGRAM(S): at 17:09

## 2020-06-25 RX ADMIN — Medication 325 MILLIGRAM(S): at 12:55

## 2020-06-25 NOTE — PROGRESS NOTE ADULT - PROBLEM SELECTOR PLAN 1
cardiology consult - outpatient cardiologist Dr Esteban  check TTE  monitor on tele  carotid US  TTE  orthostatics  check digoxin level  trop negative

## 2020-06-25 NOTE — PHYSICAL THERAPY INITIAL EVALUATION ADULT - PERTINENT HX OF CURRENT PROBLEM, REHAB EVAL
89M w/ HF, CAD, afib on pradaxa, HTN, iron deficiency anemia presents to [facility] for evaluation following fall. Patient reports a fall while walking with walker to sit down in next room for dinner but he cannot remember the mechanism. When he fell he hit his head on carpeted stairs and had bleeding from the back of his head but denies LOC, weakness or paralysis in extremities contd below:

## 2020-06-25 NOTE — PHYSICAL THERAPY INITIAL EVALUATION ADULT - CRITERIA FOR SKILLED THERAPEUTIC INTERVENTIONS
therapy frequency/predicted duration of therapy intervention/anticipated equipment needs at discharge/anticipated discharge recommendation/risk reduction/prevention/impairments found/functional limitations in following categories

## 2020-06-25 NOTE — PROGRESS NOTE ADULT - ATTENDING COMMENTS
wound care for scalp wound    admit to medicine  Full Code  will follow as his PMD  PT consult    will discuss with daughter    Jonathan Anthony,   internal medicine

## 2020-06-25 NOTE — CHART NOTE - NSCHARTNOTEFT_GEN_A_CORE
BRENNON JONES    Spoke with Dr Rodas regarding pt's echo, which shows a collapsable IVC, which is indicative of volume depletion. Patient was admitted for syncope and collapse with laceration to the left occipital region. Patient is also orthostatic positive,  while lying and  while standing. Patient will require IVF hydration and BP monitoring. Patient's daughter, Thais prefers to take patient home without volume repletion and monitoring. Attending Dr Anthony had full conversation with pt's daughter, Thais, regarding risk vs benefit of taking patient home without IVF in the setting of orthstatis. Daughter understands the risk and will take patient home and encourage PO hydar      Vital Signs Last 24 Hrs  T(C): 36.7 (25 Jun 2020 12:26), Max: 37.2 (24 Jun 2020 23:25)  T(F): 98 (25 Jun 2020 12:26), Max: 98.9 (24 Jun 2020 23:25)  HR: 76 (25 Jun 2020 16:47) (56 - 76)  BP: 148/80 (25 Jun 2020 16:47) (148/80 - 210/96)  BP(mean): --  RR: 18 (25 Jun 2020 12:26) (17 - 18)  SpO2: 96% (25 Jun 2020 12:26) (96% - 100%)                      Kia Lama

## 2020-06-25 NOTE — DISCHARGE NOTE PROVIDER - NSDCMRMEDTOKEN_GEN_ALL_CORE_FT
digoxin 125 mcg (0.125 mg) oral tablet: 1 tab(s) orally once a day  dilTIAZem 120 mg/24 hours oral capsule, extended release: 1 cap(s) orally once a day  ferrous sulfate 325 mg (65 mg elemental iron) oral tablet: 1 tab(s) orally once a day  hydrALAZINE 50 mg oral tablet: 1 tab(s) orally 4 times a day  irbesartan 300 mg oral tablet: 1 tab(s) orally once a day  isosorbide mononitrate 60 mg oral tablet, extended release: 1 tab(s) orally once a day (in the morning)  levothyroxine 100 mcg (0.1 mg) oral tablet: 1 tab(s) orally once a day  pantoprazole 40 mg oral delayed release tablet: 1 tab(s) orally once a day  Pradaxa 75 mg oral capsule: 1 cap(s) orally 2 times a day  sertraline 100 mg oral tablet: 1 tab(s) orally once a day  simvastatin 10 mg oral tablet: 1 tab(s) orally once a day (at bedtime)  Vitamin D3 5000 intl units (125 mcg) oral tablet: 1 tab(s) orally once a week (Mondays)

## 2020-06-25 NOTE — PROGRESS NOTE ADULT - SUBJECTIVE AND OBJECTIVE BOX
Chief complaint: syncope    HPI:  89M w/ HF, CAD, afib on pradaxa, HTN, iron deficiency anemia presents to [facility] for syncope at home./ states he was readinf newspaper when his daughter called him to the other room. He got up and was walking to the next room when he suddenly fell. unclear if LOC. denies palpitatons or dizziness prior to the fall. No prodromal symptoms. no seizure like activity. No SOB. no CP. no palpitations. no leg swelling as of late. no recent changes to his meds. FOllows with Dr Esteban at [facility] for cardiology. No recent illness. at this time, states only symptom is pain on back of scalp at area of laceration. had lac repaired in ED. no fever or chills     In the ED, VS 98.4, 188/83, 60, 18 97%RA  s/p Adacel vaccine (2020 22:19)      PAST MEDICAL & SURGICAL HISTORY:  CAD (coronary artery disease)  Heart failure  Atrial fibrillation  Benign Essential Hypertension  Fixation hardware in spine      REVIEW OF SYSTEMS:    CONSTITUTIONAL: No fever, weight loss, or fatigue  NECK: No pain or stiffness  RESPIRATORY: No cough, wheezing, chills or hemoptysis; No shortness of breath  CARDIOVASCULAR: No chest pain, palpitations, dizziness, or leg swelling  GASTROINTESTINAL: No abdominal or epigastric pain. No nausea, vomiting, or hematemesis; No diarrhea or constipation. No melena or hematochezia.  GENITOURINARY: No dysuria, frequency, hematuria, or incontinence  NEUROLOGICAL: No headaches, memory loss, loss of strength, numbness, or tremors  SKIN: No itching, burning, rashes, or lesions   LYMPH NODES: No enlarged glands  MUSCULOSKELETAL: No joint pain or swelling; No muscle, back, or extremity pain  HEME/LYMPH: No easy bruising, or bleeding gums    MEDICATIONS  (STANDING):  digoxin     Tablet 0.125 milliGRAM(s) Oral daily  diltiazem    milliGRAM(s) Oral daily  ferrous    sulfate 325 milliGRAM(s) Oral daily  furosemide    Tablet 40 milliGRAM(s) Oral daily  hydrALAZINE 50 milliGRAM(s) Oral four times a day  isosorbide   mononitrate ER Tablet (IMDUR) 60 milliGRAM(s) Oral daily  levothyroxine 100 MICROGram(s) Oral daily  losartan 100 milliGRAM(s) Oral daily  pantoprazole    Tablet 40 milliGRAM(s) Oral before breakfast  sertraline 100 milliGRAM(s) Oral daily  simvastatin 10 milliGRAM(s) Oral at bedtime    MEDICATIONS  (PRN):      Allergies    No Known Drug Allergies  shellfish (Hives)    Intolerances        SOCIAL HISTORY: no smoker , no ETOH , No drug use     FAMILY HISTORY:  No pertinent family history in first degree relatives      Vital Signs Last 24 Hrs  T(C): 36.9 (2020 05:35), Max: 37.2 (2020 23:25)  T(F): 98.4 (2020 05:35), Max: 98.9 (2020 23:25)  HR: 56 (2020 06:01) (56 - 67)  BP: 158/57 (2020 05:35) (158/57 - 210/96)  BP(mean): --  RR: 17 (2020 05:35) (17 - 18)  SpO2: 96% (2020 05:35) (96% - 100%)    PHYSICAL EXAM:    GENERAL: NAD, well-groomed, well-developed  HEAD:  posterior scalp wound repaired, + abrasion surrounding lac, Normocephalic  EYES: EOMI, PERRLA, conjunctiva and sclera clear  ENMT: moist mucous membranes  NECK: Supple, No JVD  NERVOUS SYSTEM:  Alert & Oriented X3, Good concentration; Motor Strength 5/5 B/L upper and lower extremities; DTRs 2+ intact and symmetric  CHEST/LUNG: Clear to percussion bilaterally; No rales, rhonchi, wheezing, or rubs  HEART: Regular rate and rhythm; distant heart sounds. + systolic murmur at RUSB  ABDOMEN: Soft, Nontender, Nondistended; Bowel sounds present  EXTREMITIES:  2+ Peripheral Pulses, No clubbing, cyanosis, or edema  LYMPH: No lymphadenopathy noted  SKIN: No rashes or lesions      LABS:                        13.4   9.89  )-----------( 235      ( 2020 20:29 )             40.4     06-    137  |  101  |  22  ----------------------------<  105<H>  3.9   |  25  |  1.31<H>    Ca    8.6      2020 05:52  Phos  3.7     06-25  Mg     1.8     06-25    TPro  7.0  /  Alb  3.9  /  TBili  0.3  /  DBili  x   /  AST  27  /  ALT  21  /  AlkPhos  82  06-24    PT/INR - ( 2020 05:52 )   PT: 12.8 sec;   INR: 1.11 ratio         PTT - ( 2020 05:52 )  PTT:31.6 sec  Urinalysis Basic - ( 2020 21:50 )    Color: Light Yellow / Appearance: Clear / S.011 / pH: x  Gluc: x / Ketone: Negative  / Bili: Negative / Urobili: Negative   Blood: x / Protein: 300 mg/dL / Nitrite: Negative   Leuk Esterase: Negative / RBC: 2 /hpf / WBC 0 /HPF   Sq Epi: x / Non Sq Epi: 1 /hpf / Bacteria: Negative        RADIOLOGY & ADDITIONAL STUDIES:

## 2020-06-25 NOTE — PHYSICAL THERAPY INITIAL EVALUATION ADULT - ADDITIONAL COMMENTS
contd from above:, seizure or tongue biting. He further denies CP, palpitations, sob, cough, incontinence following fall, fever, chills, or poor po intake. Per conversation with his daughter Ms. Thais Ward, the fall was not witnessed but she was in the next room and heard a "thump" and immediately went to see him and said he did not appear to have LOC, seizure, or acute distress and called EMS due to the head bleeding. She further reports that he maintains all ADL and is generally well but over the last 4mo or so has had 5 falls including the current episode and believes he is generally becoming weaker. CXR: (-); CT head: (-),poss small high scalp hematoma; Xray pelvis: (-)fx, spinalfixation device L3-4,L4/5 contd from above:, seizure or tongue biting. He further denies CP, palpitations, sob, cough, incontinence following fall, fever, chills, or poor po intake. Per conversation with his daughter Ms. Thais Ward, the fall was not witnessed but she was in the next room and heard a "thump" and immediately went to see him and said he did not appear to have LOC, seizure, or acute distress and called EMS due to the head bleeding. She further reports that he maintains all ADL and is generally well but over the last 4mo or so has had 5 falls including the current episode and believes he is generally becoming weaker. CXR: (-); CT head: (-),poss small high scalp hematoma; Xray pelvis: (-)fx, spinal fixation device L3-4,L4/5

## 2020-06-25 NOTE — PROVIDER CONTACT NOTE (OTHER) - REASON
Patient called office with itching secondary to norco.  She has had a similar reaction to percocet. Her pain is controlled iwht norco and itching is managed with benadryl. Discussed option of transferring to tylenol with advil alternating vs dilaudid. Will write prescription for dilaudid and leave with nursing staff for patient if she decides to take that. She understands and agrees with this course of action. Pt /90 electronically and 190/80 manually

## 2020-06-25 NOTE — CONSULT NOTE ADULT - SUBJECTIVE AND OBJECTIVE BOX
CHIEF COMPLAINT: s/p fall    HPI:  89 y/o WM w/ HF, CAD, afib on pradaxa, HTN, iron deficiency anemia presents to [facility] for evaluation following fall. Patient reports a fall while walking with walker to sit down in next room for dinner but he cannot remember the mechanism. When he fell he hit his head on carpeted stairs and had bleeding from the back of his head but denies LOC, weakness or paralysis in extremities, seizure or tongue biting. He further denies CP, palpitations, sob, cough, incontinence following fall, fever, chills, or poor po intake. Daughter, Ms. Thais Ward, reports the fall was not witnessed but she was in the next room and heard a "thump" and immediately went to see him. He did not appear to have LOC, seizure, or acute distress and called EMS due to the head bleeding. She further reports that he maintains all ADL and is generally well but over the last 4mo or so has had 5 falls including the current episode and believes he is generally becoming weaker and his legs are not able to keep him up (concerned that this maybe related to his heart failure but denies lower extremity swelling or CP/SOB or weight changes). Of note he also has been recently treated for iron deficiency anemia over the last few months and had recent EGD within 6mo which was normal and colonoscopy 2 yr prior which was also reported as normal. Last, she points out that he has had labile blood pressure for some time and they often have to call his cardiologist to determine when additional hydralazine dosing should be provided.    In the ED, VS 98.4, 188/83, 60, 18 97%RA  s/p Adacel vaccine (24 Jun 2020 22:19)      PAST MEDICAL & SURGICAL HISTORY:  CAD (coronary artery disease)  Heart failure  Atrial fibrillation  Benign Essential Hypertension  Fixation hardware in spine      Allergies    No Known Drug Allergies  shellfish (Hives)    Intolerances        SOCIAL HISTORY    Smoking Hx:  ETOH Hx:  Marital Status:  Occupational Hx:    FAMILY HISTORY:  No pertinent family history in first degree relatives      MEDICATIONS:  digoxin     Tablet 0.125 milliGRAM(s) Oral daily  diltiazem    milliGRAM(s) Oral daily  ferrous    sulfate 325 milliGRAM(s) Oral daily  furosemide    Tablet 40 milliGRAM(s) Oral daily  hydrALAZINE 50 milliGRAM(s) Oral four times a day  isosorbide   mononitrate ER Tablet (IMDUR) 60 milliGRAM(s) Oral daily  levothyroxine 100 MICROGram(s) Oral daily  losartan 100 milliGRAM(s) Oral daily  pantoprazole    Tablet 40 milliGRAM(s) Oral before breakfast  sertraline 100 milliGRAM(s) Oral daily  simvastatin 10 milliGRAM(s) Oral at bedtime      REVIEW OF SYSTEMS:    CONSTITUTIONAL: No weakness, fevers or chills  EYES/ENT: No visual changes;  No vertigo or throat pain   NECK: No pain or stiffness  RESPIRATORY: No cough, wheezing, hemoptysis; No shortness of breath  CARDIOVASCULAR: No chest pain or palpitations  GASTROINTESTINAL: No abdominal or epigastric pain. No nausea, vomiting, or hematemesis; No diarrhea or constipation. No melena or hematochezia.  GENITOURINARY: No dysuria, frequency or hematuria  NEUROLOGICAL: No numbness or weakness  SKIN: No itching, burning, rashes, or lesions   All other review of systems is negative unless indicated above    Vital Signs Last 24 Hrs  T(C): 36.9 (25 Jun 2020 05:35), Max: 37.2 (24 Jun 2020 23:25)  T(F): 98.4 (25 Jun 2020 05:35), Max: 98.9 (24 Jun 2020 23:25)  HR: 56 (25 Jun 2020 06:01) (56 - 67)  BP: 158/57 (25 Jun 2020 05:35) (158/57 - 210/96)  BP(mean): --  RR: 17 (25 Jun 2020 05:35) (17 - 18)  SpO2: 96% (25 Jun 2020 05:35) (96% - 100%)    I&O's Summary    24 Jun 2020 07:01  -  25 Jun 2020 07:00  --------------------------------------------------------  IN: 0 mL / OUT: 400 mL / NET: -400 mL    25 Jun 2020 07:01  -  25 Jun 2020 12:02  --------------------------------------------------------  IN: 240 mL / OUT: 575 mL / NET: -335 mL        PHYSICAL EXAM:    Constitutional: NAD, awake and alert, well-developed  HEENT: PERR, EOMI  Neck: soft and supple, No LAD, No JVD  Respiratory: Breath sounds are clear bilaterally, No wheezing, rales or rhonchi  Cardiovascular: Regular rate and rhythm, normal S1 and S2,  no murmurs, gallops or rubs  Gastrointestinal: Bowel Sounds present, soft, nontender.   Extremities: No peripheral edema. No clubbing or cyanosis.  Vascular: 2+ peripheral pulses  Neurological: A/O x 3, no focal deficits  Musculoskeletal: no calf tenderness.  Skin: No rashes.      LABS: All Labs Reviewed:                        12.2   10.28 )-----------( 202      ( 25 Jun 2020 07:02 )             36.7                         13.4   9.89  )-----------( 235      ( 24 Jun 2020 20:29 )             40.4     25 Jun 2020 05:52    137    |  101    |  22     ----------------------------<  105    3.9     |  25     |  1.31   24 Jun 2020 20:29    136    |  100    |  27     ----------------------------<  99     3.6     |  22     |  1.36     Ca    8.6        25 Jun 2020 05:52  Ca    9.5        24 Jun 2020 20:29  Phos  3.7       25 Jun 2020 05:52  Phos  3.8       24 Jun 2020 20:29  Mg     1.8       25 Jun 2020 05:52  Mg     2.0       24 Jun 2020 20:29    TPro  7.0    /  Alb  3.9    /  TBili  0.3    /  DBili  x      /  AST  27     /  ALT  21     /  AlkPhos  82     24 Jun 2020 20:29    PT/INR - ( 25 Jun 2020 05:52 )   PT: 12.8 sec;   INR: 1.11 ratio         PTT - ( 25 Jun 2020 05:52 )  PTT:31.6 sec  Blood Culture:     CARDIAC MARKERS:            proBNP: Serum Pro-Brain Natriuretic Peptide: 1625 pg/mL (06-24 @ 20:29)    Lipid Profile:   HgA1c:   TSH: Thyroid Stimulating Hormone, Serum: 3.87 uIU/mL (06-25 @ 02:17)            RADIOLOGY/EKG: SR LBBB

## 2020-06-25 NOTE — PROVIDER CONTACT NOTE (OTHER) - ASSESSMENT
Pt AOx4, denies headache, visual changes and cp. 98.9F Temp, HR 64, RR 18, 98% O2 on room air. Currently SR 1st degree in the 60s on tele. Pt only received 1 dose of hydralazine in the ED at 10 pm.

## 2020-06-25 NOTE — DISCHARGE NOTE PROVIDER - NSDCQMERRANDS_GEN_ALL_CORE
[General Appearance - Well Developed] : well developed [Normal Appearance] : normal appearance [Well Groomed] : well groomed [General Appearance - Well Nourished] : well nourished [No Deformities] : no deformities [General Appearance - In No Acute Distress] : no acute distress [Normal Conjunctiva] : the conjunctiva exhibited no abnormalities [Normal Oropharynx] : normal oropharynx [I] : I Yes [Neck Appearance] : the appearance of the neck was normal [Neck Cervical Mass (___cm)] : no neck mass was observed [Jugular Venous Distention Increased] : there was no jugular-venous distention [Heart Rate And Rhythm] : heart rate and rhythm were normal [Heart Sounds] : normal S1 and S2 [Murmurs] : no murmurs present [Arterial Pulses Normal] : the arterial pulses were normal [] : no respiratory distress [Respiration, Rhythm And Depth] : normal respiratory rhythm and effort [Exaggerated Use Of Accessory Muscles For Inspiration] : no accessory muscle use [Auscultation Breath Sounds / Voice Sounds] : lungs were clear to auscultation bilaterally [Bowel Sounds] : normal bowel sounds [Abdomen Soft] : soft [Abnormal Walk] : normal gait [Gait - Sufficient For Exercise Testing] : the gait was sufficient for exercise testing [Nail Clubbing] : no clubbing of the fingernails [Cyanosis, Localized] : no localized cyanosis [Skin Color & Pigmentation] : normal skin color and pigmentation [Skin Turgor] : normal skin turgor [Cranial Nerves] : cranial nerves 2-12 were intact [Deep Tendon Reflexes (DTR)] : deep tendon reflexes were 2+ and symmetric [Oriented To Time, Place, And Person] : oriented to person, place, and time [Impaired Insight] : insight and judgment were intact [FreeTextEntry1] : nasal turbinates congested

## 2020-06-25 NOTE — CONSULT NOTE ADULT - ASSESSMENT
89 y/o WM w/ HF, CAD, AF on Pradaxa, HTN, iron deficiency anemia presents to [facility] for evaluation following fall. Patient reports a fall while walking with walker to sit down in next room for dinner but he cannot remember the mechanism.  EKG demonstrating LBBB  - obtain echocardiogram  - Telemetry  - Obtain EPS consult given surface EKG evidence for conduction abnormality and syncopal episode of unexplained etiology   ? need for conduction study  - will apprise Dr Jb Esteban - outpatient cardiologist

## 2020-06-25 NOTE — DISCHARGE NOTE NURSING/CASE MANAGEMENT/SOCIAL WORK - NSDCPEPRADAXA_GEN_ALL_CORE
Dabigatran/Pradaxa - Dietary Advice/Dabigatran/Pradaxa - Follow up monitoring/Dabigatran/Pradaxa - Compliance/Dabigatran/Pradaxa - Potential for adverse drug reactions and interactions

## 2020-06-25 NOTE — DISCHARGE NOTE NURSING/CASE MANAGEMENT/SOCIAL WORK - PATIENT PORTAL LINK FT
You can access the FollowMyHealth Patient Portal offered by [facility] by registering at the following website: [email or web address]. By joining [facility]’s FollowMyHealth portal, you will also be able to view your health information using other applications (apps) compatible with our system.

## 2020-06-25 NOTE — PHYSICAL THERAPY INITIAL EVALUATION ADULT - DISCHARGE DISPOSITION, PT EVAL
rehabilitation facility/Subacute Rehab, if pt go to home, will require home PT for balance gait and strengthening and assistance for ALL functional activities

## 2020-06-25 NOTE — PHYSICAL THERAPY INITIAL EVALUATION ADULT - IMPAIRMENTS CONTRIBUTING TO GAIT DEVIATIONS, PT EVAL
unsteady, post trunk lean, sitting back into bed; losing balance/impaired balance/decreased strength/decreased flexibility

## 2020-06-25 NOTE — PROVIDER CONTACT NOTE (OTHER) - ACTION/TREATMENT ORDERED:
GERDA Abraham aware. Recheck BP in one hour and notify provider.   Continue to monitor and maintain safety.

## 2020-06-25 NOTE — DISCHARGE NOTE PROVIDER - PROVIDER TOKENS
PROVIDER:[TOKEN:[[id number]],FOLLOWUP:[2 weeks],ESTABLISHEDPATIENT:[T]],PROVIDER:[TOKEN:[[id number]],FOLLOWUP:[1-3 days],ESTABLISHEDPATIENT:[T]]

## 2020-06-25 NOTE — DISCHARGE NOTE PROVIDER - CARE PROVIDER_API CALL
Jonathan Anthony  INTERNAL MEDICINE  [address], [address]  [place] [address]  Phone: [phone]  Fax: [phone]  Established Patient  Follow Up Time: 2 weeks    Jb Esteban  Cardiology  [address]  [place] [address]  Phone: [phone]  Fax: [phone]  Established Patient  Follow Up Time: 1-3 days

## 2020-06-27 LAB
-  AMPICILLIN/SULBACTAM: SIGNIFICANT CHANGE UP
-  CEFAZOLIN: SIGNIFICANT CHANGE UP
-  GENTAMICIN: SIGNIFICANT CHANGE UP
-  OXACILLIN: SIGNIFICANT CHANGE UP
-  RIFAMPIN: SIGNIFICANT CHANGE UP
-  TETRACYCLINE: SIGNIFICANT CHANGE UP
-  TRIMETHOPRIM/SULFAMETHOXAZOLE: SIGNIFICANT CHANGE UP
-  VANCOMYCIN: SIGNIFICANT CHANGE UP
CULTURE RESULTS: SIGNIFICANT CHANGE UP
METHOD TYPE: SIGNIFICANT CHANGE UP
ORGANISM # SPEC MICROSCOPIC CNT: SIGNIFICANT CHANGE UP
ORGANISM # SPEC MICROSCOPIC CNT: SIGNIFICANT CHANGE UP
SPECIMEN SOURCE: SIGNIFICANT CHANGE UP

## 2020-12-21 ENCOUNTER — EMERGENCY (EMERGENCY)
Facility: HOSPITAL | Age: 85
LOS: 1 days | Discharge: ANOTHER HOSPITAL | End: 2020-12-21
Attending: EMERGENCY MEDICINE | Admitting: EMERGENCY MEDICINE
Payer: MEDICARE

## 2020-12-21 VITALS
SYSTOLIC BLOOD PRESSURE: 207 MMHG | HEART RATE: 68 BPM | DIASTOLIC BLOOD PRESSURE: 95 MMHG | OXYGEN SATURATION: 98 % | WEIGHT: 169.98 LBS | TEMPERATURE: 98 F | RESPIRATION RATE: 20 BRPM | HEIGHT: 71 IN

## 2020-12-21 VITALS
TEMPERATURE: 98 F | HEART RATE: 68 BPM | SYSTOLIC BLOOD PRESSURE: 158 MMHG | OXYGEN SATURATION: 99 % | RESPIRATION RATE: 20 BRPM | DIASTOLIC BLOOD PRESSURE: 72 MMHG

## 2020-12-21 DIAGNOSIS — Z96.7 PRESENCE OF OTHER BONE AND TENDON IMPLANTS: Chronic | ICD-10-CM

## 2020-12-21 LAB
ALBUMIN SERPL ELPH-MCNC: 2.9 G/DL — LOW (ref 3.3–5)
ALP SERPL-CCNC: 73 U/L — SIGNIFICANT CHANGE UP (ref 30–120)
ALT FLD-CCNC: 32 U/L DA — SIGNIFICANT CHANGE UP (ref 10–60)
ANION GAP SERPL CALC-SCNC: 9 MMOL/L — SIGNIFICANT CHANGE UP (ref 5–17)
APTT BLD: 42.4 SEC — HIGH (ref 27.5–35.5)
AST SERPL-CCNC: 33 U/L — SIGNIFICANT CHANGE UP (ref 10–40)
BASOPHILS # BLD AUTO: 0.04 K/UL — SIGNIFICANT CHANGE UP (ref 0–0.2)
BASOPHILS NFR BLD AUTO: 0.3 % — SIGNIFICANT CHANGE UP (ref 0–2)
BILIRUB SERPL-MCNC: 0.3 MG/DL — SIGNIFICANT CHANGE UP (ref 0.2–1.2)
BUN SERPL-MCNC: 24 MG/DL — HIGH (ref 7–23)
CALCIUM SERPL-MCNC: 8.7 MG/DL — SIGNIFICANT CHANGE UP (ref 8.4–10.5)
CHLORIDE SERPL-SCNC: 101 MMOL/L — SIGNIFICANT CHANGE UP (ref 96–108)
CK MB BLD-MCNC: 2.1 % — SIGNIFICANT CHANGE UP (ref 0–3.5)
CK MB CFR SERPL CALC: 2.7 NG/ML — SIGNIFICANT CHANGE UP (ref 0–3.6)
CK SERPL-CCNC: 128 U/L — SIGNIFICANT CHANGE UP (ref 39–308)
CO2 SERPL-SCNC: 26 MMOL/L — SIGNIFICANT CHANGE UP (ref 22–31)
CREAT SERPL-MCNC: 1.97 MG/DL — HIGH (ref 0.5–1.3)
EOSINOPHIL # BLD AUTO: 0.09 K/UL — SIGNIFICANT CHANGE UP (ref 0–0.5)
EOSINOPHIL NFR BLD AUTO: 0.8 % — SIGNIFICANT CHANGE UP (ref 0–6)
GLUCOSE BLDC GLUCOMTR-MCNC: 145 MG/DL — HIGH (ref 70–99)
GLUCOSE SERPL-MCNC: 161 MG/DL — HIGH (ref 70–99)
HCT VFR BLD CALC: 38.9 % — LOW (ref 39–50)
HGB BLD-MCNC: 12.9 G/DL — LOW (ref 13–17)
IMM GRANULOCYTES NFR BLD AUTO: 0.7 % — SIGNIFICANT CHANGE UP (ref 0–1.5)
INR BLD: 1.04 RATIO — SIGNIFICANT CHANGE UP (ref 0.88–1.16)
LYMPHOCYTES # BLD AUTO: 1.01 K/UL — SIGNIFICANT CHANGE UP (ref 1–3.3)
LYMPHOCYTES # BLD AUTO: 8.5 % — LOW (ref 13–44)
MCHC RBC-ENTMCNC: 31.1 PG — SIGNIFICANT CHANGE UP (ref 27–34)
MCHC RBC-ENTMCNC: 33.2 GM/DL — SIGNIFICANT CHANGE UP (ref 32–36)
MCV RBC AUTO: 93.7 FL — SIGNIFICANT CHANGE UP (ref 80–100)
MONOCYTES # BLD AUTO: 0.65 K/UL — SIGNIFICANT CHANGE UP (ref 0–0.9)
MONOCYTES NFR BLD AUTO: 5.5 % — SIGNIFICANT CHANGE UP (ref 2–14)
NEUTROPHILS # BLD AUTO: 9.99 K/UL — HIGH (ref 1.8–7.4)
NEUTROPHILS NFR BLD AUTO: 84.2 % — HIGH (ref 43–77)
NRBC # BLD: 0 /100 WBCS — SIGNIFICANT CHANGE UP (ref 0–0)
PLATELET # BLD AUTO: 246 K/UL — SIGNIFICANT CHANGE UP (ref 150–400)
POTASSIUM SERPL-MCNC: 3.8 MMOL/L — SIGNIFICANT CHANGE UP (ref 3.5–5.3)
POTASSIUM SERPL-SCNC: 3.8 MMOL/L — SIGNIFICANT CHANGE UP (ref 3.5–5.3)
PROT SERPL-MCNC: 6.8 G/DL — SIGNIFICANT CHANGE UP (ref 6–8.3)
PROTHROM AB SERPL-ACNC: 12.6 SEC — SIGNIFICANT CHANGE UP (ref 10.6–13.6)
RBC # BLD: 4.15 M/UL — LOW (ref 4.2–5.8)
RBC # FLD: 13.2 % — SIGNIFICANT CHANGE UP (ref 10.3–14.5)
SARS-COV-2 RNA SPEC QL NAA+PROBE: SIGNIFICANT CHANGE UP
SODIUM SERPL-SCNC: 136 MMOL/L — SIGNIFICANT CHANGE UP (ref 135–145)
TROPONIN I SERPL-MCNC: 0.24 NG/ML — HIGH (ref 0.02–0.06)
WBC # BLD: 11.86 K/UL — HIGH (ref 3.8–10.5)
WBC # FLD AUTO: 11.86 K/UL — HIGH (ref 3.8–10.5)

## 2020-12-21 PROCEDURE — 93005 ELECTROCARDIOGRAM TRACING: CPT

## 2020-12-21 PROCEDURE — 82550 ASSAY OF CK (CPK): CPT

## 2020-12-21 PROCEDURE — 85610 PROTHROMBIN TIME: CPT

## 2020-12-21 PROCEDURE — 71045 X-RAY EXAM CHEST 1 VIEW: CPT | Mod: 26

## 2020-12-21 PROCEDURE — 85730 THROMBOPLASTIN TIME PARTIAL: CPT

## 2020-12-21 PROCEDURE — 85025 COMPLETE CBC W/AUTO DIFF WBC: CPT

## 2020-12-21 PROCEDURE — 84484 ASSAY OF TROPONIN QUANT: CPT

## 2020-12-21 PROCEDURE — U0003: CPT

## 2020-12-21 PROCEDURE — 80053 COMPREHEN METABOLIC PANEL: CPT

## 2020-12-21 PROCEDURE — 70450 CT HEAD/BRAIN W/O DYE: CPT

## 2020-12-21 PROCEDURE — 99285 EMERGENCY DEPT VISIT HI MDM: CPT | Mod: 25

## 2020-12-21 PROCEDURE — 99285 EMERGENCY DEPT VISIT HI MDM: CPT

## 2020-12-21 PROCEDURE — 70450 CT HEAD/BRAIN W/O DYE: CPT | Mod: 26

## 2020-12-21 PROCEDURE — 82553 CREATINE MB FRACTION: CPT

## 2020-12-21 PROCEDURE — 71045 X-RAY EXAM CHEST 1 VIEW: CPT

## 2020-12-21 PROCEDURE — 36415 COLL VENOUS BLD VENIPUNCTURE: CPT

## 2020-12-21 PROCEDURE — 93010 ELECTROCARDIOGRAM REPORT: CPT

## 2020-12-21 PROCEDURE — 82962 GLUCOSE BLOOD TEST: CPT

## 2020-12-21 RX ORDER — ASPIRIN/CALCIUM CARB/MAGNESIUM 324 MG
325 TABLET ORAL ONCE
Refills: 0 | Status: COMPLETED | OUTPATIENT
Start: 2020-12-21 | End: 2020-12-21

## 2020-12-21 RX ADMIN — Medication 325 MILLIGRAM(S): at 23:40

## 2020-12-21 NOTE — ED PROVIDER NOTE - OBJECTIVE STATEMENT
89yo male bib ems with possible tia. pt states after dinner he had a period where he could not get the words out, witnessed by his daughter, pt had difficulty dialing 911, no blurry or double vision, no slurred speech, pt has no complaints at this time 89yo male bib ems with possible tia. pt states after dinner he had a period where he could not get the words out, he had difficulty picking up his dishes, but he managed to ambulate into another room of the house, some of it was witnessed by his daughter, daughter checked his BP at home and it was over 240 systolic.  pt had difficulty pressing his alert button, he was also shaking, states he felt like he was "in a dream"  no blurry or double vision, no slurred speech, pt has no complaints at this time

## 2020-12-21 NOTE — ED ADULT NURSE NOTE - OBJECTIVE STATEMENT
89yo male BIBA as per ems " his daughter thinks he had a stroke". pt indicates symptoms "after eating pork chops , I couldn't speak right" as per pt. Pt daughter Lilian indicates pt wasn't able to  articulate his words and "started to stay at the wall". pt denies any pain/distress. pt denies LOC, dizziness, blur vision and headache.

## 2020-12-21 NOTE — ED PROVIDER NOTE - PMH
Atrial fibrillation    Benign Essential Hypertension    CAD (coronary artery disease)    Heart failure

## 2020-12-21 NOTE — ED ADULT NURSE NOTE - NSIMPLEMENTINTERV_GEN_ALL_ED
Implemented All Fall with Harm Risk Interventions:  [place] to call system. Call bell, personal items and telephone within reach. Instruct patient to call for assistance. Room bathroom lighting operational. Non-slip footwear when patient is off stretcher. Physically safe environment: no spills, clutter or unnecessary equipment. Stretcher in lowest position, wheels locked, appropriate side rails in place. Provide visual cue, wrist band, yellow gown, etc. Monitor gait and stability. Monitor for mental status changes and reorient to person, place, and time. Review medications for side effects contributing to fall risk. Reinforce activity limits and safety measures with patient and family. Provide visual clues: red socks.

## 2020-12-22 ENCOUNTER — TRANSCRIPTION ENCOUNTER (OUTPATIENT)
Age: 85
End: 2020-12-22

## 2020-12-22 ENCOUNTER — INPATIENT (INPATIENT)
Facility: HOSPITAL | Age: 85
LOS: 0 days | Discharge: ANOTHER HOSPITAL | DRG: 78 | End: 2020-12-23
Attending: INTERNAL MEDICINE | Admitting: INTERNAL MEDICINE
Payer: MEDICARE

## 2020-12-22 VITALS
SYSTOLIC BLOOD PRESSURE: 148 MMHG | WEIGHT: 169.98 LBS | RESPIRATION RATE: 16 BRPM | TEMPERATURE: 98 F | DIASTOLIC BLOOD PRESSURE: 71 MMHG | OXYGEN SATURATION: 96 % | HEART RATE: 73 BPM | HEIGHT: 71 IN

## 2020-12-22 DIAGNOSIS — G45.9 TRANSIENT CEREBRAL ISCHEMIC ATTACK, UNSPECIFIED: ICD-10-CM

## 2020-12-22 DIAGNOSIS — I48.0 PAROXYSMAL ATRIAL FIBRILLATION: ICD-10-CM

## 2020-12-22 DIAGNOSIS — I10 ESSENTIAL (PRIMARY) HYPERTENSION: ICD-10-CM

## 2020-12-22 DIAGNOSIS — E78.49 OTHER HYPERLIPIDEMIA: ICD-10-CM

## 2020-12-22 DIAGNOSIS — I25.10 ATHEROSCLEROTIC HEART DISEASE OF NATIVE CORONARY ARTERY WITHOUT ANGINA PECTORIS: ICD-10-CM

## 2020-12-22 DIAGNOSIS — E03.9 HYPOTHYROIDISM, UNSPECIFIED: ICD-10-CM

## 2020-12-22 DIAGNOSIS — Z96.7 PRESENCE OF OTHER BONE AND TENDON IMPLANTS: Chronic | ICD-10-CM

## 2020-12-22 PROBLEM — I50.9 HEART FAILURE, UNSPECIFIED: Chronic | Status: ACTIVE | Noted: 2020-06-24

## 2020-12-22 PROBLEM — I48.91 UNSPECIFIED ATRIAL FIBRILLATION: Chronic | Status: ACTIVE | Noted: 2020-06-24

## 2020-12-22 LAB
A1C WITH ESTIMATED AVERAGE GLUCOSE RESULT: 5.4 % — SIGNIFICANT CHANGE UP (ref 4–5.6)
ALBUMIN SERPL ELPH-MCNC: 2.7 G/DL — LOW (ref 3.3–5)
ALP SERPL-CCNC: 70 U/L — SIGNIFICANT CHANGE UP (ref 40–120)
ALT FLD-CCNC: 24 U/L — SIGNIFICANT CHANGE UP (ref 12–78)
ANION GAP SERPL CALC-SCNC: 8 MMOL/L — SIGNIFICANT CHANGE UP (ref 5–17)
AST SERPL-CCNC: 24 U/L — SIGNIFICANT CHANGE UP (ref 15–37)
BASOPHILS # BLD AUTO: 0.03 K/UL — SIGNIFICANT CHANGE UP (ref 0–0.2)
BASOPHILS NFR BLD AUTO: 0.3 % — SIGNIFICANT CHANGE UP (ref 0–2)
BILIRUB SERPL-MCNC: 0.3 MG/DL — SIGNIFICANT CHANGE UP (ref 0.2–1.2)
BUN SERPL-MCNC: 28 MG/DL — HIGH (ref 7–23)
CALCIUM SERPL-MCNC: 8 MG/DL — LOW (ref 8.5–10.1)
CHLORIDE SERPL-SCNC: 106 MMOL/L — SIGNIFICANT CHANGE UP (ref 96–108)
CHOLEST SERPL-MCNC: 241 MG/DL — HIGH
CO2 SERPL-SCNC: 25 MMOL/L — SIGNIFICANT CHANGE UP (ref 22–31)
CREAT SERPL-MCNC: 1.9 MG/DL — HIGH (ref 0.5–1.3)
EOSINOPHIL # BLD AUTO: 0.1 K/UL — SIGNIFICANT CHANGE UP (ref 0–0.5)
EOSINOPHIL NFR BLD AUTO: 0.9 % — SIGNIFICANT CHANGE UP (ref 0–6)
ESTIMATED AVERAGE GLUCOSE: 108 MG/DL — SIGNIFICANT CHANGE UP (ref 68–114)
GLUCOSE SERPL-MCNC: 101 MG/DL — HIGH (ref 70–99)
HCT VFR BLD CALC: 36.1 % — LOW (ref 39–50)
HDLC SERPL-MCNC: 48 MG/DL — SIGNIFICANT CHANGE UP
HGB BLD-MCNC: 12.2 G/DL — LOW (ref 13–17)
IMM GRANULOCYTES NFR BLD AUTO: 0.8 % — SIGNIFICANT CHANGE UP (ref 0–1.5)
LIPID PNL WITH DIRECT LDL SERPL: 149 MG/DL — HIGH
LYMPHOCYTES # BLD AUTO: 1.53 K/UL — SIGNIFICANT CHANGE UP (ref 1–3.3)
LYMPHOCYTES # BLD AUTO: 14.4 % — SIGNIFICANT CHANGE UP (ref 13–44)
MCHC RBC-ENTMCNC: 31.4 PG — SIGNIFICANT CHANGE UP (ref 27–34)
MCHC RBC-ENTMCNC: 33.8 GM/DL — SIGNIFICANT CHANGE UP (ref 32–36)
MCV RBC AUTO: 92.8 FL — SIGNIFICANT CHANGE UP (ref 80–100)
MONOCYTES # BLD AUTO: 0.9 K/UL — SIGNIFICANT CHANGE UP (ref 0–0.9)
MONOCYTES NFR BLD AUTO: 8.4 % — SIGNIFICANT CHANGE UP (ref 2–14)
NEUTROPHILS # BLD AUTO: 8.02 K/UL — HIGH (ref 1.8–7.4)
NEUTROPHILS NFR BLD AUTO: 75.2 % — SIGNIFICANT CHANGE UP (ref 43–77)
NON HDL CHOLESTEROL: 193 MG/DL — HIGH
NRBC # BLD: 0 /100 WBCS — SIGNIFICANT CHANGE UP (ref 0–0)
PLATELET # BLD AUTO: 224 K/UL — SIGNIFICANT CHANGE UP (ref 150–400)
POTASSIUM SERPL-MCNC: 4.2 MMOL/L — SIGNIFICANT CHANGE UP (ref 3.5–5.3)
POTASSIUM SERPL-SCNC: 4.2 MMOL/L — SIGNIFICANT CHANGE UP (ref 3.5–5.3)
PROT SERPL-MCNC: 6.5 G/DL — SIGNIFICANT CHANGE UP (ref 6–8.3)
RBC # BLD: 3.89 M/UL — LOW (ref 4.2–5.8)
RBC # FLD: 13.4 % — SIGNIFICANT CHANGE UP (ref 10.3–14.5)
SARS-COV-2 IGG SERPL QL IA: NEGATIVE — SIGNIFICANT CHANGE UP
SARS-COV-2 IGM SERPL IA-ACNC: <0.1 INDEX — SIGNIFICANT CHANGE UP
SODIUM SERPL-SCNC: 139 MMOL/L — SIGNIFICANT CHANGE UP (ref 135–145)
T3 SERPL-MCNC: 43 NG/DL — LOW (ref 80–200)
T4 AB SER-ACNC: 4.4 UG/DL — LOW (ref 4.6–12)
TRIGL SERPL-MCNC: 217 MG/DL — HIGH
TROPONIN I SERPL-MCNC: 0.18 NG/ML — HIGH (ref 0.01–0.04)
TROPONIN I SERPL-MCNC: 0.26 NG/ML — HIGH (ref 0.01–0.04)
TSH SERPL-MCNC: 7.12 UIU/ML — HIGH (ref 0.36–3.74)
WBC # BLD: 10.66 K/UL — HIGH (ref 3.8–10.5)
WBC # FLD AUTO: 10.66 K/UL — HIGH (ref 3.8–10.5)

## 2020-12-22 PROCEDURE — 70551 MRI BRAIN STEM W/O DYE: CPT | Mod: 26

## 2020-12-22 PROCEDURE — 70544 MR ANGIOGRAPHY HEAD W/O DYE: CPT | Mod: 26,59

## 2020-12-22 PROCEDURE — 99284 EMERGENCY DEPT VISIT MOD MDM: CPT

## 2020-12-22 RX ORDER — DABIGATRAN ETEXILATE MESYLATE 150 MG/1
75 CAPSULE ORAL EVERY 12 HOURS
Refills: 0 | Status: DISCONTINUED | OUTPATIENT
Start: 2020-12-22 | End: 2020-12-23

## 2020-12-22 RX ORDER — PANTOPRAZOLE SODIUM 20 MG/1
40 TABLET, DELAYED RELEASE ORAL
Refills: 0 | Status: DISCONTINUED | OUTPATIENT
Start: 2020-12-22 | End: 2020-12-23

## 2020-12-22 RX ORDER — DIGOXIN 250 MCG
0.12 TABLET ORAL DAILY
Refills: 0 | Status: DISCONTINUED | OUTPATIENT
Start: 2020-12-22 | End: 2020-12-23

## 2020-12-22 RX ORDER — LOSARTAN POTASSIUM 100 MG/1
100 TABLET, FILM COATED ORAL DAILY
Refills: 0 | Status: DISCONTINUED | OUTPATIENT
Start: 2020-12-22 | End: 2020-12-23

## 2020-12-22 RX ORDER — LEVOTHYROXINE SODIUM 125 MCG
100 TABLET ORAL DAILY
Refills: 0 | Status: DISCONTINUED | OUTPATIENT
Start: 2020-12-22 | End: 2020-12-23

## 2020-12-22 RX ORDER — HYDRALAZINE HCL 50 MG
50 TABLET ORAL
Refills: 0 | Status: DISCONTINUED | OUTPATIENT
Start: 2020-12-22 | End: 2020-12-22

## 2020-12-22 RX ORDER — ISOSORBIDE MONONITRATE 60 MG/1
60 TABLET, EXTENDED RELEASE ORAL DAILY
Refills: 0 | Status: DISCONTINUED | OUTPATIENT
Start: 2020-12-22 | End: 2020-12-23

## 2020-12-22 RX ORDER — SERTRALINE 25 MG/1
100 TABLET, FILM COATED ORAL DAILY
Refills: 0 | Status: DISCONTINUED | OUTPATIENT
Start: 2020-12-22 | End: 2020-12-23

## 2020-12-22 RX ORDER — HYDRALAZINE HCL 50 MG
50 TABLET ORAL EVERY 6 HOURS
Refills: 0 | Status: DISCONTINUED | OUTPATIENT
Start: 2020-12-22 | End: 2020-12-23

## 2020-12-22 RX ORDER — ATORVASTATIN CALCIUM 80 MG/1
10 TABLET, FILM COATED ORAL AT BEDTIME
Refills: 0 | Status: DISCONTINUED | OUTPATIENT
Start: 2020-12-22 | End: 2020-12-23

## 2020-12-22 RX ORDER — DILTIAZEM HCL 120 MG
120 CAPSULE, EXT RELEASE 24 HR ORAL DAILY
Refills: 0 | Status: DISCONTINUED | OUTPATIENT
Start: 2020-12-22 | End: 2020-12-23

## 2020-12-22 RX ORDER — HYDRALAZINE HCL 50 MG
100 TABLET ORAL EVERY 6 HOURS
Refills: 0 | Status: DISCONTINUED | OUTPATIENT
Start: 2020-12-22 | End: 2020-12-23

## 2020-12-22 RX ADMIN — DABIGATRAN ETEXILATE MESYLATE 75 MILLIGRAM(S): 150 CAPSULE ORAL at 17:42

## 2020-12-22 RX ADMIN — PANTOPRAZOLE SODIUM 40 MILLIGRAM(S): 20 TABLET, DELAYED RELEASE ORAL at 06:30

## 2020-12-22 RX ADMIN — Medication 100 MILLIGRAM(S): at 23:51

## 2020-12-22 RX ADMIN — DABIGATRAN ETEXILATE MESYLATE 75 MILLIGRAM(S): 150 CAPSULE ORAL at 06:30

## 2020-12-22 RX ADMIN — Medication 100 MICROGRAM(S): at 06:30

## 2020-12-22 RX ADMIN — ISOSORBIDE MONONITRATE 60 MILLIGRAM(S): 60 TABLET, EXTENDED RELEASE ORAL at 11:43

## 2020-12-22 RX ADMIN — Medication 0.12 MILLIGRAM(S): at 08:43

## 2020-12-22 RX ADMIN — Medication 50 MILLIGRAM(S): at 06:31

## 2020-12-22 RX ADMIN — LOSARTAN POTASSIUM 100 MILLIGRAM(S): 100 TABLET, FILM COATED ORAL at 06:31

## 2020-12-22 RX ADMIN — Medication 50 MILLIGRAM(S): at 17:42

## 2020-12-22 RX ADMIN — Medication 50 MILLIGRAM(S): at 11:42

## 2020-12-22 RX ADMIN — SERTRALINE 100 MILLIGRAM(S): 25 TABLET, FILM COATED ORAL at 11:42

## 2020-12-22 RX ADMIN — Medication 50 MILLIGRAM(S): at 09:36

## 2020-12-22 RX ADMIN — Medication 100 MILLIGRAM(S): at 20:39

## 2020-12-22 RX ADMIN — ATORVASTATIN CALCIUM 10 MILLIGRAM(S): 80 TABLET, FILM COATED ORAL at 20:39

## 2020-12-22 NOTE — DISCHARGE NOTE PROVIDER - NSDCCPCAREPLAN_GEN_ALL_CORE_FT
PRINCIPAL DISCHARGE DIAGNOSIS  Diagnosis: Hypertensive encephalopathy  Assessment and Plan of Treatment:       SECONDARY DISCHARGE DIAGNOSES  Diagnosis: HTN (hypertension)  Assessment and Plan of Treatment: uncontrolled , clonidine added pt transferred to [facility] on family request

## 2020-12-22 NOTE — CONSULT NOTE ADULT - ASSESSMENT
food impaction    npo  urgent EGD for food impaction  d/w patient  d/w primary   covid negative  d/w OR   added on for emergent EGD

## 2020-12-22 NOTE — ED ADULT NURSE NOTE - OBJECTIVE STATEMENT
Patient with history of   transferred from [facility] via ambulance for admission for TIA. Patient with history of atrial fibrillation (pradaxa), HTN and heart failure transferred from [facility] via ambulance for admission for TIA.  Patient experienced brief period of aphasia, "shaking" and unable to  the dishes last night.  Witnessed by daughter who took patient's blood pressure and found it to be elevated into the 200s systolic.  Symptoms have resolved.  Patient is speaking clearly, no facial droop noted and maintaining saliva.  Denies pain.  Able to  and use room phone independently.

## 2020-12-22 NOTE — CONSULT NOTE ADULT - ASSESSMENT
The patient is a 89 year old male with a history of TIA, atrial fibrillation, HTN, HL, hypothyroidism, chronic diastolic heart failure who was transferred for possible TIA.    Plan:  - Symptoms may be due to hypertensive encephalopathy  - ECG done but not available; will obtain  - Troponin mildly elevated at 0.26 in the setting of possible HTN urgency. Low suspicion for ACS. Repeat pending.  - Continue digoxin 0.125 mg daily  - Continue diltiazem  mg daily  - Continue hydralazine 50 mg qid  - Add prn hydralazine in addition to standing dose  - Continue losartan 100 mg daily  - Continue imdur 60 mg daily  - Continue dabigatran 75 mg bid  - Continue atorvastatin 10 mg daily  - Check echo  - Monitor on telemetry  - Neurology eval

## 2020-12-22 NOTE — CONSULT NOTE ADULT - SUBJECTIVE AND OBJECTIVE BOX
[facility]  Yo Mendoza PA-C  [address] [place] [address]  [phone]      Chief Complaint:  Patient is a 89y old  Male who presents with a chief complaint of confusion disorientation (22 Dec 2020 14:53)      HPI :89yo male hx of afib, CAD, CHF BIBEMS with possible tia. pt states after dinner he had a period where he could not get the words out, he had difficulty picking up his dishes, but he managed to ambulate into another room of the house, some of it was witnessed by his daughter, daughter checked his BP at home and it was over 240 systolic.  pt had difficulty pressing his alert button, he was also shaking, states he felt like he was "in a dream"  no blurry or double vision, no slurred speech,pt has no complaints at this timeno fever, no loss of consciousness, no nausea, no numbness, no vomiting          Allergies:  Drug Allergies Not Recorded  shellfish (Unknown)      Medications:  atorvastatin 10 milliGRAM(s) Oral at bedtime  dabigatran 75 milliGRAM(s) Oral every 12 hours  digoxin     Tablet 0.125 milliGRAM(s) Oral daily  diltiazem    milliGRAM(s) Oral daily  hydrALAZINE 50 milliGRAM(s) Oral four times a day  hydrALAZINE 50 milliGRAM(s) Oral every 6 hours PRN  isosorbide   mononitrate ER Tablet (IMDUR) 60 milliGRAM(s) Oral daily  levothyroxine 100 MICROGram(s) Oral daily  losartan 100 milliGRAM(s) Oral daily  pantoprazole    Tablet 40 milliGRAM(s) Oral before breakfast  sertraline 100 milliGRAM(s) Oral daily      PMHX/PSHX:  Heart failure    Hyperlipidemia    Hypertension    Afib        Family history:      Social History:     ROS:     General:  No wt loss, fevers, chills, night sweats, fatigue,   Eyes:  Good vision, no reported pain  ENT:  No sore throat, pain, runny nose, dysphagia  CV:  No pain, palpitations, hypo/hypertension  Resp:  No dyspnea, cough, tachypnea, wheezing  GI:  No pain, No nausea, No vomiting, No diarrhea, No constipation, No weight loss, No fever, No pruritis, No rectal bleeding, No tarry stools, No dysphagia,  :  No pain, bleeding, incontinence, nocturia  Muscle:  No pain, weakness  Neuro:  No weakness, tingling, memory problems  Psych:  No fatigue, insomnia, mood problems, depression  Endocrine:  No polyuria, polydipsia, cold/heat intolerance  Heme:  No petechiae, ecchymosis, easy bruisability  Skin:  No rash, tattoos, scars, edema      PHYSICAL EXAM:   Vital Signs:  Vital Signs Last 24 Hrs  T(C): 36.6 (22 Dec 2020 12:52), Max: 36.8 (22 Dec 2020 03:22)  T(F): 97.9 (22 Dec 2020 12:52), Max: 98.3 (22 Dec 2020 03:22)  HR: 68 (22 Dec 2020 12:52) (51 - 73)  BP: 166/76 (22 Dec 2020 12:52) (148/71 - 189/88)  BP(mean): --  RR: 18 (22 Dec 2020 13:00) (16 - 18)  SpO2: 96% (22 Dec 2020 13:00) (93% - 96%)  Daily Height in cm: 180.34 (22 Dec 2020 01:08)    Daily Weight in k.9 (22 Dec 2020 05:17)    GENERAL:  Appears stated age, well-groomed, well-nourished, no distress  HEENT:  NC/AT,  conjunctivae clear and pink, no thyromegaly, nodules, adenopathy, no JVD, sclera -anicteric  CHEST:  Full & symmetric excursion, no increased effort, breath sounds clear  HEART:  Regular rhythm, S1, S2, no murmur/rub/S3/S4, no abdominal bruit, no edema  ABDOMEN:  Soft, non-tender, non-distended, normoactive bowel sounds,  no masses ,no hepato-splenomegaly, no signs of chronic liver disease  EXTEREMITIES:  no cyanosis,clubbing or edema  SKIN:  No rash/erythema/ecchymoses/petechiae/wounds/abscess/warm/dry  NEURO:  Alert, oriented, no asterixis, no tremor, no encephalopathy    LABS:                        12.2   10.66 )-----------( 224      ( 22 Dec 2020 03:34 )             36.1         139  |  106  |  28<H>  ----------------------------<  101<H>  4.2   |  25  |  1.90<H>    Ca    8.0<L>      22 Dec 2020 03:34    TPro  6.5  /  Alb  2.7<L>  /  TBili  0.3  /  DBili  x   /  AST  24  /  ALT  24  /  AlkPhos  70      LIVER FUNCTIONS - ( 22 Dec 2020 03:34 )  Alb: 2.7 g/dL / Pro: 6.5 g/dL / ALK PHOS: 70 U/L / ALT: 24 U/L / AST: 24 U/L / GGT: x                   Imaging:          
History of Present Illness: The patient is a 89 year old male with a history of TIA, atrial fibrillation, HTN, HL, hypothyroidism, chronic diastolic heart failure who was transferred for possible TIA. After dinner, he felt like he was hallucinating or dreaming. His body was very weak and he could not stand up. His legs were shaky. He denies changes with speech. No palpitations, dizziness, chest pain, or shortness of breath. As per notes, his daughter was present for part of this and his SBP was noted to be over 240.    Past Medical/Surgical History:  TIA, atrial fibrillation, HTN, HL, hypothyroidism, chronic diastolic heart failure    Medications:  Home Medications:  atorvastatin 10 mg oral tablet: 1 tab(s) orally once a day (22 Dec 2020 01:58)  digoxin 125 mcg (0.125 mg) oral tablet: 1 tab(s) orally once a day (22 Dec 2020 01:58)  dilTIAZem 120 mg/24 hours oral capsule, extended release: 1 cap(s) orally once a day (22 Dec 2020 01:58)  ferrous sulfate 325 mg (65 mg elemental iron) oral tablet: 1 tab(s) orally every other day (22 Dec 2020 01:58)  hydrALAZINE 50 mg oral tablet: 1 tab(s) orally 4 times a day (22 Dec 2020 01:58)  irbesartan 300 mg oral tablet: 1 tab(s) orally once a day (22 Dec 2020 01:58)  isosorbide mononitrate 60 mg oral tablet, extended release: 1 tab(s) orally once a day (in the morning) (22 Dec 2020 01:58)  levothyroxine 100 mcg (0.1 mg) oral tablet: 1 tab(s) orally once a day (22 Dec 2020 01:58)  pantoprazole 40 mg oral delayed release tablet: 1 tab(s) orally once a day (22 Dec 2020 01:58)  Pradaxa 75 mg oral capsule: 1 cap(s) orally 2 times a day (22 Dec 2020 01:58)  sertraline 100 mg oral tablet: 1 tab(s) orally once a day (22 Dec 2020 01:58)  Vitamin D2 2000 intl units (50 mcg) oral capsule: 1 cap(s) orally once a day (22 Dec 2020 01:58)      Family History: Non-contributory family history of premature cardiovascular atherosclerotic disease    Social History: No tobacco, alcohol or drug use    Review of Systems:  General: No fevers, chills, weight loss or gain  Skin: No rashes, color changes  Cardiovascular: No chest pain, orthopnea  Respiratory: No shortness of breath, cough  Gastrointestinal: No nausea, abdominal pain  Genitourinary: No incontinence, pain with urination  Musculoskeletal: No pain, swelling, decreased range of motion  Neurological: No headache, weakness  Psychiatric: No depression, anxiety  Endocrine: No weight loss or gain, increased thirst  All other systems are comprehensively negative.    Physical Exam:  Vitals:        Vital Signs Last 24 Hrs  T(C): 36.6 (22 Dec 2020 05:17), Max: 36.8 (22 Dec 2020 03:22)  T(F): 97.8 (22 Dec 2020 05:17), Max: 98.3 (22 Dec 2020 03:22)  HR: 51 (22 Dec 2020 05:46) (51 - 73)  BP: 178/85 (22 Dec 2020 06:27) (148/71 - 179/82)  BP(mean): --  RR: 17 (22 Dec 2020 05:17) (16 - 18)  SpO2: 95% (22 Dec 2020 05:17) (95% - 96%)  General: NAD  HEENT: MMM  Neck: No JVD, no carotid bruit  Lungs: CTAB  CV: RRR, nl S1/S2, no M/R/G  Abdomen: S/NT/ND, +BS  Extremities: No LE edema, no cyanosis  Neuro: AAOx3, non-focal  Skin: No rash    Labs:                        12.2   10.66 )-----------( 224      ( 22 Dec 2020 03:34 )             36.1     12-22    139  |  106  |  28<H>  ----------------------------<  101<H>  4.2   |  25  |  1.90<H>    Ca    8.0<L>      22 Dec 2020 03:34    TPro  6.5  /  Alb  2.7<L>  /  TBili  0.3  /  DBili  x   /  AST  24  /  ALT  24  /  AlkPhos  70  12-22    CARDIAC MARKERS ( 22 Dec 2020 03:34 )  .263 ng/mL / x     / x     / x     / x              ECG: Pending    
  Patient is a 89y old  Male who presents with a chief complaint of confusion disorientation (22 Dec 2020 09:11)      Reason For Consult: abnl tfts    HPI:   89yo male hx of afib, CAD, CHF BIBEMS with possible tia. pt states after dinner he had a period where he could not get the words out, he had difficulty picking up his dishes, but he managed to ambulate into another room of the house, some of it was witnessed by his daughter, daughter checked his BP at home and it was over 240 systolic.  pt had difficulty pressing his alert button, he was also shaking, states he felt like he was "in a dream"  no blurry or double vision, no slurred speech,pt has no complaints at this timeno fever, no loss of consciousness, no nausea, no numbness, no vomiting   (22 Dec 2020 01:42)      PAST MEDICAL & SURGICAL HISTORY:  Heart failure    Hyperlipidemia    Hypertension    Afib        FAMILY HISTORY:        Social History:    MEDICATIONS  (STANDING):  atorvastatin 10 milliGRAM(s) Oral at bedtime  dabigatran 75 milliGRAM(s) Oral every 12 hours  digoxin     Tablet 0.125 milliGRAM(s) Oral daily  diltiazem    milliGRAM(s) Oral daily  hydrALAZINE 50 milliGRAM(s) Oral four times a day  isosorbide   mononitrate ER Tablet (IMDUR) 60 milliGRAM(s) Oral daily  levothyroxine 100 MICROGram(s) Oral daily  losartan 100 milliGRAM(s) Oral daily  pantoprazole    Tablet 40 milliGRAM(s) Oral before breakfast  sertraline 100 milliGRAM(s) Oral daily    MEDICATIONS  (PRN):  hydrALAZINE 50 milliGRAM(s) Oral every 6 hours PRN SBP>180 or DBP>100        T(C): 36.6 (12-22-20 @ 08:34), Max: 36.8 (12-22-20 @ 03:22)  HR: 58 (12-22-20 @ 08:50) (51 - 73)  BP: 184/87 (12-22-20 @ 08:34) (148/71 - 184/87)  RR: 18 (12-22-20 @ 08:34) (16 - 18)  SpO2: 93% (12-22-20 @ 08:34) (93% - 96%)  Wt(kg): --    PHYSICAL EXAM:  CHEST/LUNG: Clear to percussion bilaterally; No rales, rhonchi, wheezing, or rubs  HEART: Regular rate and rhythm; No murmurs, rubs, or gallops  ABDOMEN: Soft, Nontender, Nondistended; Bowel sounds present  EXTREMITIES:  2+ Peripheral Pulses, No clubbing, cyanosis, or edema  SKIN: No rashes or lesions    CAPILLARY BLOOD GLUCOSE                                12.2   10.66 )-----------( 224      ( 22 Dec 2020 03:34 )             36.1       CMP:  12-22 @ 03:34  SGPT 24  Albumin 2.7   Alk Phos 70   Anion Gap 8   SGOT 24   Total Bili 0.3   BUN 28   Calcium Total 8.0   CO2 25   Chloride 106   Creatinine 1.90   eGFR if AA 34   eGFR if non AA 30   Glucose 101   Potassium 4.2   Protein 6.5   Sodium 139      Thyroid Function Tests:  12-22 @ 03:34 TSH 7.12 FreeT4 -- T3 -- Anti TPO -- Anti Thyroglobulin Ab -- TSI --      Diabetes Tests:       Radiology:

## 2020-12-22 NOTE — SWALLOW BEDSIDE ASSESSMENT ADULT - SLP PERTINENT HISTORY OF CURRENT PROBLEM
Per charting,  89yo male hx of afib, CAD, CHF BIBEMS with possible tia. pt states after dinner he had a period where he could not get the words out, he had difficulty picking up his dishes, but he managed to ambulate into another room of the house, some of it was witnessed by his daughter, daughter checked his BP at home and it was over 240 systolic.  pt had difficulty pressing his alert button, he was also shaking, states he felt like he was "in a dream"  no blurry or double vision, no slurred speech,pt has no complaints at this timeno fever, no loss of consciousness, no nausea, no numbness, no vomiting. Pt admitted with possible TIA likely hypertensive encephalopathy.

## 2020-12-22 NOTE — DIETITIAN INITIAL EVALUATION ADULT. - ADD RECOMMEND
1) Continue with regular diet, defer texture and consistency to SLP, 2) Encourage adequate PO intake, 3) Monitor pt's PO intake, weight, skin, edema, GI distress

## 2020-12-22 NOTE — PHYSICAL THERAPY INITIAL EVALUATION ADULT - PERTINENT HX OF CURRENT PROBLEM, REHAB EVAL
89yo male hx of afib, CAD, CHF BIBEMS with possible tia. pt states after dinner he had a period where he could not get the words out, he had difficulty picking up his dishes, but he managed to ambulate into another room of the house, some of it was witnessed by his daughter, daughter checked his BP at home and it was over 240 systolic.

## 2020-12-22 NOTE — ED PROVIDER NOTE - OBJECTIVE STATEMENT
89yo male hx of afib, CAD, CHF BIBEMS with possible tia. pt states after dinner he had a period where he could not get the words out, he had difficulty picking up his dishes, but he managed to ambulate into another room of the house, some of it was witnessed by his daughter, daughter checked his BP at home and it was over 240 systolic.  pt had difficulty pressing his alert button, he was also shaking, states he felt like he was "in a dream"  no blurry or double vision, no slurred speech, pt has no complaints at this time    · Presenting Symptoms: CONFUSION, DISORIENTATION  · Negative Findings: no fever, no loss of consciousness, no nausea, no numbness, no vomiting  · Timing: sudden onset, resolved  · Duration: minute(s)  · Severity: MILD  · Witnessed By: daughter  · Aggravated Factors: none  · Relieving Factors: none    transferred from [place] for TIA workup, Dr. Hillman admitting, CT head negative

## 2020-12-22 NOTE — SWALLOW BEDSIDE ASSESSMENT ADULT - SWALLOW EVAL: CRITERIA FOR SKILLED INTERVENTION MET
team to reconsult this service pending GI work up and recommendations/not appropriate for swallowing intervention

## 2020-12-22 NOTE — PATIENT PROFILE ADULT - ARE SIGNIFICANT INDICATORS COMPLETE.
No Nsaids Counseling: NSAID Counseling: I discussed with the patient that NSAIDs should be taken with food. Prolonged use of NSAIDs can result in the development of stomach ulcers.  Patient advised to stop taking NSAIDs if abdominal pain occurs.  The patient verbalized understanding of the proper use and possible adverse effects of NSAIDs.  All of the patient's questions and concerns were addressed. Yes

## 2020-12-22 NOTE — PROVIDER CONTACT NOTE (MEDICATION) - ASSESSMENT
"              After Visit Summary   3/7/2018    Toro Collazo    MRN: [id number]           Patient Information     Date Of Birth          2017        Visit Information        Provider Department      3/7/2018 9:40 AM Cuca Amado MD [facility]        Today's Diagnoses     OME (otitis media with effusion), right    -  1    Drug exanthem           Follow-ups after your visit        Who to contact     If you have questions or need follow up information about today's clinic visit or your schedule please contact [facility] directly at [phone].  Normal or non-critical lab and imaging results will be communicated to you by [employer], letter or phone within 4 business days after the clinic has received the results. If you do not hear from us within 7 days, please contact the clinic through [employer] or phone. If you have a critical or abnormal lab result, we will notify you by phone as soon as possible.  Submit refill requests through [employer] or call your pharmacy and they will forward the refill request to us. Please allow 3 business days for your refill to be completed.          Additional Information About Your Visit        MyChart Information     [employer] lets you send messages to your doctor, view your test results, renew your prescriptions, schedule appointments and more. To sign up, go to [email or web address], contact your [place] clinic or call [phone] during business hours.            Care EveryWhere ID     This is your Care EveryWhere ID. This could be used by other organizations to access your [place] medical records  [id number]        Your Vitals Were     Pulse Temperature Height Pulse Oximetry BMI (Body Mass Index)       130 98.2  F (36.8  C) (Axillary) 2' 5\" (0.737 m) 100% 14.42 kg/m2        Blood Pressure from Last 3 Encounters:   No data found for BP    Weight from Last 3 Encounters:   03/07/18 17 lb 4 oz (7.825 kg) (10 %)*   03/04/18 18 lb 7.5 " oz (8.377 kg) (25 %)*   02/16/18 17 lb 6.2 oz (7.887 kg) (15 %)*     * Growth percentiles are based on WHO (Girls, 0-2 years) data.              Today, you had the following     No orders found for display         Today's Medication Changes          These changes are accurate as of 3/7/18  1:36 PM.  If you have any questions, ask your nurse or doctor.               Start taking these medicines.        Dose/Directions    azithromycin 100 MG/5ML suspension   Commonly known as:  ZITHROMAX   Used for:  OME (otitis media with effusion), right   Started by:  Cuca Amado MD        Dose:  10 mg/kg   Take 4 mLs (80 mg) by mouth daily for 3 days   Quantity:  12 mL   Refills:  0            Where to get your medicines      These medications were sent to [facility] - [place], [place] - [address]  [address], [place] [address]     Phone:  [phone]     azithromycin 100 MG/5ML suspension                Primary Care Provider Office Phone # Fax #    Janet Manriqueshantell Davidson, KAIN [facility] [phone]       [address] [place] [address]        Equal Access to Services     YAMEL KELLEY AH: Hadii bozena deano Soankita, waaxda luqadaha, qaybta kaalmada adeegyada, inderjit jain. So [facility] [phone].    ATENCIÓN: Si habla español, tiene a hsu disposición servicios gratuitos de asistencia lingüística. Llame al [phone].    We comply with applicable federal civil rights laws and [place] laws. We do not discriminate on the basis of race, color, national origin, age, disability, sex, sexual orientation, or gender identity.            Thank you!     Thank you for choosing [facility]  for your care. Our goal is always to provide you with excellent care. Hearing back from our patients is one way we can continue to improve our services. Please take a few minutes to complete the written survey that you may receive in the mail after your  denies discomfort, no distress visit with us. Thank you!             Your Updated Medication List - Protect others around you: Learn how to safely use, store and throw away your medicines at www.disposemymeds.org.          This list is accurate as of 3/7/18  1:36 PM.  Always use your most recent med list.                   Brand Name Dispense Instructions for use Diagnosis    amoxicillin-clavulanate 400-57 MG/5ML suspension    AUGMENTIN    48 mL    Take 2.4 mLs (192 mg) by mouth 2 times daily for 10 days Give with food or milk.    Otitis media of right ear in pediatric patient       azithromycin 100 MG/5ML suspension    ZITHROMAX    12 mL    Take 4 mLs (80 mg) by mouth daily for 3 days    OME (otitis media with effusion), right

## 2020-12-22 NOTE — DIETITIAN INITIAL EVALUATION ADULT. - FACTORS AFF FOOD INTAKE
Pt seen by SLP today however unable to evaluate at this time  as pt is NPO for GI workup./difficulty swallowing

## 2020-12-22 NOTE — H&P ADULT - PROBLEM SELECTOR PLAN 1
neuro checks, neurology consult, continue aspirin, Anticoag, statin, cardiac monitoring for AFIB and rate control and AC, fall precautions, aspiration precautions, speech swallow, PT assesement

## 2020-12-22 NOTE — H&P ADULT - HISTORY OF PRESENT ILLNESS
89yo male hx of afib, CAD, CHF BIBEMS with possible tia. pt states after dinner he had a period where he could not get the words out, he had difficulty picking up his dishes, but he managed to ambulate into another room of the house, some of it was witnessed by his daughter, daughter checked his BP at home and it was over 240 systolic.  pt had difficulty pressing his alert button, he was also shaking, states he felt like he was "in a dream"  no blurry or double vision, no slurred speech,pt has no complaints at this timeno fever, no loss of consciousness, no nausea, no numbness, no vomiting

## 2020-12-22 NOTE — DISCHARGE NOTE PROVIDER - NSDCMRMEDTOKEN_GEN_ALL_CORE_FT
atorvastatin 10 mg oral tablet: 1 tab(s) orally once a day  digoxin 125 mcg (0.125 mg) oral tablet: 1 tab(s) orally once a day  dilTIAZem 120 mg/24 hours oral capsule, extended release: 1 cap(s) orally once a day  ferrous sulfate 325 mg (65 mg elemental iron) oral tablet: 1 tab(s) orally every other day  hydrALAZINE 50 mg oral tablet: 1 tab(s) orally 4 times a day  irbesartan 300 mg oral tablet: 1 tab(s) orally once a day  isosorbide mononitrate 60 mg oral tablet, extended release: 1 tab(s) orally once a day (in the morning)  levothyroxine 100 mcg (0.1 mg) oral tablet: 1 tab(s) orally once a day  pantoprazole 40 mg oral delayed release tablet: 1 tab(s) orally once a day  Pradaxa 75 mg oral capsule: 1 cap(s) orally 2 times a day  sertraline 100 mg oral tablet: 1 tab(s) orally once a day  Vitamin D2 2000 intl units (50 mcg) oral capsule: 1 cap(s) orally once a day   cloNIDine 0.1 mg oral tablet: 1 tab(s) orally 2 times a day  digoxin 125 mcg (0.125 mg) oral tablet: 1 tab(s) orally once a day  dilTIAZem 120 mg/24 hours oral capsule, extended release: 1 cap(s) orally once a day  ferrous sulfate 325 mg (65 mg elemental iron) oral tablet: 1 tab(s) orally once a day  hydrALAZINE 100 mg oral tablet: 1 tab(s) orally every 6 hours  irbesartan 300 mg oral tablet: 1 tab(s) orally once a day  isosorbide mononitrate 60 mg oral tablet, extended release: 1 tab(s) orally once a day (in the morning)  levothyroxine 100 mcg (0.1 mg) oral tablet: 1 tab(s) orally once a day  pantoprazole 40 mg oral delayed release tablet: 1 tab(s) orally once a day  Pradaxa 75 mg oral capsule: 1 cap(s) orally 2 times a day  sertraline 100 mg oral tablet: 1 tab(s) orally once a day  simvastatin 10 mg oral tablet: 1 tab(s) orally once a day (at bedtime)  Vitamin D2 2000 intl units (50 mcg) oral capsule: 1 cap(s) orally once a day

## 2020-12-22 NOTE — SWALLOW BEDSIDE ASSESSMENT ADULT - COMMENTS
Pt received upright in bed, awake and alert, on room air. Pt reported he has had globus sensation in the mid/lower throat region s/p eating fish for lunch. Pt reported liquid wash did not relieve sensation, but rather exacerbated globus sensation. Pt also reported he feels mucus is "building up" when attempting to complete volitional/effortful swallow. Pt denied respiratory distress. RN made aware of aforementioned report immediately, who reported GI has been consulted. Per discussion with Dr. Alcantara, hold clinical swallow assessment at this time pending GI work up. Recommend pt to remain NPO pending GI work up; please reconsult this service pending GI recommendations. Pt received upright in bed, awake and alert, on room air. Pt reported he has had globus sensation in the mid/lower throat region s/p eating fish for lunch. Pt reported liquid wash did not relieve sensation, but rather exacerbated globus sensation. Pt also reported he feels mucus is "building up" when attempting to complete volitional/effortful swallow. Pt denied respiratory distress. RN made aware of aforementioned report immediately, who reported GI has been consulted. Per discussion with Dr. Bray, hold clinical swallow assessment at this time pending GI work up. Recommend pt to remain NPO pending GI work up; please reconsult this service pending GI recommendations.

## 2020-12-22 NOTE — H&P ADULT - ASSESSMENT
89yo male hx of afib, CAD, CHF BIBEMS with possible tia. pt states after dinner he had a period where he could not get the words out, he had difficulty picking up his dishes, but he managed to ambulate into another room of the house, some of it was witnessed by his daughter, daughter checked his BP at home and it was over 240 systolic.  pt had difficulty pressing his alert button, he was also shaking, states he felt like he was "in a dream"  no blurry or double vision, no slurred speech,pt has no complaints at this timeno fever, no loss of consciousness, no nausea, no numbness, no vomiting    admitted with  possible TIA  CAD, CHF  AFIB paroxysmal  89yo male hx of afib, CAD, CHF BIBEMS with possible tia. pt states after dinner he had a period where he could not get the words out, he had difficulty picking up his dishes, but he managed to ambulate into another room of the house, some of it was witnessed by his daughter, daughter checked his BP at home and it was over 240 systolic.  pt had difficulty pressing his alert button, he was also shaking, states he felt like he was "in a dream"  no blurry or double vision, no slurred speech,pt has no complaints at this timeno fever, no loss of consciousness, no nausea, no numbness, no vomiting    admitted with  possible TIA likely hypertensive encephalopathy  CAD, CHF  AFIB paroxysmal   Ess HTN , uncontrolled        neuro checlks, neuro consult , w up for r/o CVA

## 2020-12-22 NOTE — SWALLOW BEDSIDE ASSESSMENT ADULT - SWALLOW EVAL: RECOMMENDED DIET
oral nutrition/hydration/medication is contraindicated pending GI work up, discussed with Dr. Alcantara oral nutrition/hydration/medication is contraindicated pending GI work up, discussed with Dr. Bray

## 2020-12-22 NOTE — DIETITIAN INITIAL EVALUATION ADULT. - ORAL INTAKE PTA/DIET HISTORY
Pt reports good appetite and PO intake PTA. Denies following any dietary restrictions PTA. Supplement use PTA includes iron and Vitamin D. Pt with food allergy to shrimp.

## 2020-12-22 NOTE — DIETITIAN INITIAL EVALUATION ADULT. - OTHER INFO
As per chart pt is a 89 year old male with a PMH of Afib, CAD, CHF, HTN, HLD, admitted with TIA.     Pt seen at bedside. Pt was on a Dysphagia 2 Mechanical soft with thin liquids however is now noted to be NPO pending endoscopy as pt reported that he feels like fish from lunch was caught in his throat, however pt states that this has now resolved. Pt's admission weight 170lbs, current weight per chart (12/22) 191.5lbs, current weight likely inaccurate pt appears closer to admisison weight. Pt reports current weight if 172-173lbs, which he states his high for him, reports UBW of 169-170lbs, states that his Lasix was increased when his weight started to increase, weight change likely due to fluid shifts. Denies currently any nausea/ vomiting/ diarrhea, reports some constipation usually goes 1-2x day, last BM 12/21.     No pressure injuries noted at this time

## 2020-12-22 NOTE — SWALLOW BEDSIDE ASSESSMENT ADULT - SWALLOW EVAL: DIAGNOSIS
PO trials deferred at this time 2/2 report of significant globus sensation s/p eating lunch. Pt denied respiratory distress. Pt is pending GI consult with Dr. Alcantara, who stated to hold clinical swallow assessment at this time. Recommend pt be NPO pending GI work up. Please reconsult this service pending GI recommendations. PO trials deferred at this time 2/2 report of significant globus sensation s/p eating lunch. Pt denied respiratory distress. Pt is pending GI consult with Dr. Bray, who stated to hold clinical swallow assessment at this time. Recommend pt be NPO pending GI work up. Please reconsult this service pending GI recommendations.

## 2020-12-22 NOTE — DISCHARGE NOTE PROVIDER - CARE PROVIDER_API CALL
Jorge Simpson  NEUROLOGY  [address] [place] [address]  Phone: [phone]  Fax: [phone]  Follow Up Time:

## 2020-12-23 ENCOUNTER — TRANSCRIPTION ENCOUNTER (OUTPATIENT)
Age: 85
End: 2020-12-23

## 2020-12-23 VITALS
SYSTOLIC BLOOD PRESSURE: 153 MMHG | DIASTOLIC BLOOD PRESSURE: 68 MMHG | TEMPERATURE: 98 F | HEART RATE: 51 BPM | RESPIRATION RATE: 17 BRPM | OXYGEN SATURATION: 94 %

## 2020-12-23 DIAGNOSIS — R41.82 ALTERED MENTAL STATUS, UNSPECIFIED: ICD-10-CM

## 2020-12-23 LAB
A1C WITH ESTIMATED AVERAGE GLUCOSE RESULT: 5.4 % — SIGNIFICANT CHANGE UP (ref 4–5.6)
ALBUMIN SERPL ELPH-MCNC: 2.5 G/DL — LOW (ref 3.3–5)
ALP SERPL-CCNC: 67 U/L — SIGNIFICANT CHANGE UP (ref 40–120)
ALT FLD-CCNC: 22 U/L — SIGNIFICANT CHANGE UP (ref 12–78)
ANION GAP SERPL CALC-SCNC: 6 MMOL/L — SIGNIFICANT CHANGE UP (ref 5–17)
AST SERPL-CCNC: 24 U/L — SIGNIFICANT CHANGE UP (ref 15–37)
BILIRUB SERPL-MCNC: 0.5 MG/DL — SIGNIFICANT CHANGE UP (ref 0.2–1.2)
BUN SERPL-MCNC: 23 MG/DL — SIGNIFICANT CHANGE UP (ref 7–23)
CALCIUM SERPL-MCNC: 8.2 MG/DL — LOW (ref 8.5–10.1)
CHLORIDE SERPL-SCNC: 108 MMOL/L — SIGNIFICANT CHANGE UP (ref 96–108)
CHOLEST SERPL-MCNC: 256 MG/DL — HIGH
CO2 SERPL-SCNC: 27 MMOL/L — SIGNIFICANT CHANGE UP (ref 22–31)
CREAT SERPL-MCNC: 1.3 MG/DL — SIGNIFICANT CHANGE UP (ref 0.5–1.3)
ESTIMATED AVERAGE GLUCOSE: 108 MG/DL — SIGNIFICANT CHANGE UP (ref 68–114)
GLUCOSE SERPL-MCNC: 91 MG/DL — SIGNIFICANT CHANGE UP (ref 70–99)
HCT VFR BLD CALC: 37.9 % — LOW (ref 39–50)
HDLC SERPL-MCNC: 45 MG/DL — SIGNIFICANT CHANGE UP
HGB BLD-MCNC: 12.8 G/DL — LOW (ref 13–17)
LIPID PNL WITH DIRECT LDL SERPL: 167 MG/DL — HIGH
MCHC RBC-ENTMCNC: 31.3 PG — SIGNIFICANT CHANGE UP (ref 27–34)
MCHC RBC-ENTMCNC: 33.8 GM/DL — SIGNIFICANT CHANGE UP (ref 32–36)
MCV RBC AUTO: 92.7 FL — SIGNIFICANT CHANGE UP (ref 80–100)
NON HDL CHOLESTEROL: 211 MG/DL — HIGH
NRBC # BLD: 0 /100 WBCS — SIGNIFICANT CHANGE UP (ref 0–0)
PLATELET # BLD AUTO: 236 K/UL — SIGNIFICANT CHANGE UP (ref 150–400)
POTASSIUM SERPL-MCNC: 3.6 MMOL/L — SIGNIFICANT CHANGE UP (ref 3.5–5.3)
POTASSIUM SERPL-SCNC: 3.6 MMOL/L — SIGNIFICANT CHANGE UP (ref 3.5–5.3)
PROT SERPL-MCNC: 5.9 G/DL — LOW (ref 6–8.3)
RBC # BLD: 4.09 M/UL — LOW (ref 4.2–5.8)
RBC # FLD: 13.2 % — SIGNIFICANT CHANGE UP (ref 10.3–14.5)
SODIUM SERPL-SCNC: 141 MMOL/L — SIGNIFICANT CHANGE UP (ref 135–145)
TRIGL SERPL-MCNC: 221 MG/DL — HIGH
TSH SERPL-MCNC: 8.86 UIU/ML — HIGH (ref 0.36–3.74)
WBC # BLD: 9.7 K/UL — SIGNIFICANT CHANGE UP (ref 3.8–10.5)
WBC # FLD AUTO: 9.7 K/UL — SIGNIFICANT CHANGE UP (ref 3.8–10.5)

## 2020-12-23 RX ORDER — PANTOPRAZOLE SODIUM 20 MG/1
1 TABLET, DELAYED RELEASE ORAL
Qty: 0 | Refills: 0 | DISCHARGE

## 2020-12-23 RX ORDER — HYDRALAZINE HCL 50 MG
10 TABLET ORAL EVERY 4 HOURS
Refills: 0 | Status: DISCONTINUED | OUTPATIENT
Start: 2020-12-23 | End: 2020-12-23

## 2020-12-23 RX ORDER — ERGOCALCIFEROL 1.25 MG/1
1 CAPSULE ORAL
Qty: 0 | Refills: 0 | DISCHARGE

## 2020-12-23 RX ORDER — HYDRALAZINE HCL 50 MG
1 TABLET ORAL
Qty: 0 | Refills: 0 | DISCHARGE

## 2020-12-23 RX ORDER — DILTIAZEM HCL 120 MG
1 CAPSULE, EXT RELEASE 24 HR ORAL
Qty: 0 | Refills: 0 | DISCHARGE

## 2020-12-23 RX ORDER — FERROUS SULFATE 325(65) MG
1 TABLET ORAL
Qty: 0 | Refills: 0 | DISCHARGE

## 2020-12-23 RX ORDER — ATORVASTATIN CALCIUM 80 MG/1
1 TABLET, FILM COATED ORAL
Qty: 0 | Refills: 0 | DISCHARGE

## 2020-12-23 RX ORDER — SERTRALINE 25 MG/1
1 TABLET, FILM COATED ORAL
Qty: 0 | Refills: 0 | DISCHARGE

## 2020-12-23 RX ORDER — ISOSORBIDE MONONITRATE 60 MG/1
1 TABLET, EXTENDED RELEASE ORAL
Qty: 0 | Refills: 0 | DISCHARGE

## 2020-12-23 RX ORDER — SIMVASTATIN 20 MG/1
1 TABLET, FILM COATED ORAL
Qty: 0 | Refills: 0 | DISCHARGE

## 2020-12-23 RX ORDER — DIGOXIN 250 MCG
1 TABLET ORAL
Qty: 0 | Refills: 0 | DISCHARGE

## 2020-12-23 RX ORDER — DABIGATRAN ETEXILATE MESYLATE 150 MG/1
1 CAPSULE ORAL
Qty: 0 | Refills: 0 | DISCHARGE

## 2020-12-23 RX ORDER — HYDRALAZINE HCL 50 MG
50 TABLET ORAL ONCE
Refills: 0 | Status: COMPLETED | OUTPATIENT
Start: 2020-12-23 | End: 2020-12-23

## 2020-12-23 RX ORDER — CHOLECALCIFEROL (VITAMIN D3) 125 MCG
1 CAPSULE ORAL
Qty: 0 | Refills: 0 | DISCHARGE

## 2020-12-23 RX ORDER — HYDRALAZINE HCL 50 MG
1 TABLET ORAL
Qty: 0 | Refills: 0 | DISCHARGE
Start: 2020-12-23

## 2020-12-23 RX ORDER — LEVOTHYROXINE SODIUM 125 MCG
1 TABLET ORAL
Qty: 0 | Refills: 0 | DISCHARGE

## 2020-12-23 RX ORDER — IRBESARTAN 75 MG/1
1 TABLET ORAL
Qty: 0 | Refills: 0 | DISCHARGE

## 2020-12-23 RX ADMIN — Medication 0.1 MILLIGRAM(S): at 17:39

## 2020-12-23 RX ADMIN — Medication 0.12 MILLIGRAM(S): at 05:16

## 2020-12-23 RX ADMIN — ISOSORBIDE MONONITRATE 60 MILLIGRAM(S): 60 TABLET, EXTENDED RELEASE ORAL at 13:12

## 2020-12-23 RX ADMIN — PANTOPRAZOLE SODIUM 40 MILLIGRAM(S): 20 TABLET, DELAYED RELEASE ORAL at 05:16

## 2020-12-23 RX ADMIN — DABIGATRAN ETEXILATE MESYLATE 75 MILLIGRAM(S): 150 CAPSULE ORAL at 05:16

## 2020-12-23 RX ADMIN — LOSARTAN POTASSIUM 100 MILLIGRAM(S): 100 TABLET, FILM COATED ORAL at 05:16

## 2020-12-23 RX ADMIN — Medication 10 MILLIGRAM(S): at 16:25

## 2020-12-23 RX ADMIN — DABIGATRAN ETEXILATE MESYLATE 75 MILLIGRAM(S): 150 CAPSULE ORAL at 17:40

## 2020-12-23 RX ADMIN — Medication 120 MILLIGRAM(S): at 05:17

## 2020-12-23 RX ADMIN — SERTRALINE 100 MILLIGRAM(S): 25 TABLET, FILM COATED ORAL at 13:12

## 2020-12-23 RX ADMIN — Medication 100 MILLIGRAM(S): at 17:39

## 2020-12-23 RX ADMIN — Medication 100 MILLIGRAM(S): at 05:16

## 2020-12-23 RX ADMIN — Medication 0.1 MILLIGRAM(S): at 08:54

## 2020-12-23 RX ADMIN — Medication 50 MILLIGRAM(S): at 09:58

## 2020-12-23 RX ADMIN — Medication 100 MICROGRAM(S): at 05:16

## 2020-12-23 NOTE — PROGRESS NOTE ADULT - SUBJECTIVE AND OBJECTIVE BOX
Chief Complaint: AMS    Interval Events: BP remained elevated overnight.    Review of Systems:  General: No fevers, chills, weight loss or gain  Skin: No rashes, color changes  Cardiovascular: No chest pain, orthopnea  Respiratory: No shortness of breath, cough  Gastrointestinal: No nausea, abdominal pain  Genitourinary: No incontinence, pain with urination  Musculoskeletal: No pain, swelling, decreased range of motion  Neurological: No headache, weakness  Psychiatric: No depression, anxiety  Endocrine: No weight loss or gain, increased thirst  All other systems are comprehensively negative.    Physical Exam:  Vitals:        Vital Signs Last 24 Hrs  T(C): 36.6 (23 Dec 2020 08:18), Max: 37.3 (22 Dec 2020 20:05)  T(F): 97.9 (23 Dec 2020 08:18), Max: 99.2 (22 Dec 2020 20:05)  HR: 61 (23 Dec 2020 09:46) (54 - 68)  BP: 199/92 (23 Dec 2020 09:46) (166/76 - 211/91)  BP(mean): --  RR: 17 (23 Dec 2020 08:18) (15 - 18)  SpO2: 97% (23 Dec 2020 09:46) (93% - 97%)  General: NAD  HEENT: MMM  Neck: No JVD, no carotid bruit  Lungs: CTAB  CV: RRR, nl S1/S2, no M/R/G  Abdomen: S/NT/ND, +BS  Extremities: No LE edema, no cyanosis  Neuro: AAOx3, non-focal  Skin: No rash    Labs:                        12.8   9.70  )-----------( 236      ( 23 Dec 2020 07:54 )             37.9     12-23    141  |  108  |  23  ----------------------------<  91  3.6   |  27  |  1.30    Ca    8.2<L>      23 Dec 2020 07:54    TPro  5.9<L>  /  Alb  2.5<L>  /  TBili  0.5  /  DBili  x   /  AST  24  /  ALT  22  /  AlkPhos  67  12-23    CARDIAC MARKERS ( 22 Dec 2020 09:21 )  .184 ng/mL / x     / x     / x     / x      CARDIAC MARKERS ( 22 Dec 2020 03:34 )  .263 ng/mL / x     / x     / x     / x              Telemetry: Sinus rhythm
Neurology follow up note    BRENNON [id number]      Interval History:    Patient feels ok no new complaints.    MEDICATIONS    atorvastatin 10 milliGRAM(s) Oral at bedtime  cloNIDine 0.1 milliGRAM(s) Oral two times a day  dabigatran 75 milliGRAM(s) Oral every 12 hours  digoxin     Tablet 0.125 milliGRAM(s) Oral daily  diltiazem    milliGRAM(s) Oral daily  hydrALAZINE 100 milliGRAM(s) Oral every 6 hours  hydrALAZINE Injectable 10 milliGRAM(s) IV Push every 4 hours PRN  isosorbide   mononitrate ER Tablet (IMDUR) 60 milliGRAM(s) Oral daily  levothyroxine 100 MICROGram(s) Oral daily  losartan 100 milliGRAM(s) Oral daily  pantoprazole    Tablet 40 milliGRAM(s) Oral before breakfast  sertraline 100 milliGRAM(s) Oral daily      Allergies    No Known Drug Allergies  shellfish (Hives)  shellfish (Unknown)    Intolerances            Vital Signs Last 24 Hrs  T(C): 36.6 (23 Dec 2020 08:18), Max: 37.3 (22 Dec 2020 20:05)  T(F): 97.9 (23 Dec 2020 08:18), Max: 99.2 (22 Dec 2020 20:05)  HR: 61 (23 Dec 2020 09:46) (54 - 68)  BP: 132/68 (23 Dec 2020 10:30) (132/68 - 211/91)  BP(mean): --  RR: 17 (23 Dec 2020 08:18) (15 - 18)  SpO2: 97% (23 Dec 2020 09:46) (93% - 97%)      REVIEW OF SYSTEMS:  Constitutional:  The patient denies fever, chills, or night sweats.  Head:  No headaches.  Eyes:  No double vision or blurry vision.  Ears:  No ringing in the ears.  Neck:  No neck pain.  Respiratory:  No shortness of breath.  Cardiovascular:  No chest pain.  Abdomen:  No nausea, vomiting, or abdominal pain.  Extremities/Neurological:  No numbness or tingling.  Musculoskeletal:  No joint pain.  Psychiatry:  Psychiatry-wise, positive history of underlying depression.  Genitourinary:  No burning upon urination.    PHYSICAL EXAMINATION:   HEENT:  Head:  Normocephalic, atraumatic.  Eyes:  No scleral icterus.  Ears:  Hearing bilaterally intact.  NECK:  Supple.  RESPIRATORY:  Good air entry bilaterally.  CARDIOVASCULAR:  S1 and S2 heard.  ABDOMEN:  Soft and nontender.  EXTREMITIES:  No clubbing or cyanosis were noted.      NEUROLOGIC:  The patient is awake and alert.  Location was hospital, year was 2020, month was December, was able to tell daughter's name, able to name simple objects.  Extraocular movements were intact.  Speech was fluent.  Smile was symmetric.  Full visual fields.  Motor:  Bilateral upper and lower were 4+/5.  Sensory:  Bilateral upper and lower intact to light touch.  No drift.  Finger-to-nose within normal limits.            LABS:  CBC Full  -  ( 23 Dec 2020 07:54 )  WBC Count : 9.70 K/uL  RBC Count : 4.09 M/uL  Hemoglobin : 12.8 g/dL  Hematocrit : 37.9 %  Platelet Count - Automated : 236 K/uL  Mean Cell Volume : 92.7 fl  Mean Cell Hemoglobin : 31.3 pg  Mean Cell Hemoglobin Concentration : 33.8 gm/dL  Auto Neutrophil # : x  Auto Lymphocyte # : x  Auto Monocyte # : x  Auto Eosinophil # : x  Auto Basophil # : x  Auto Neutrophil % : x  Auto Lymphocyte % : x  Auto Monocyte % : x  Auto Eosinophil % : x  Auto Basophil % : x      12-23    141  |  108  |  23  ----------------------------<  91  3.6   |  27  |  1.30    Ca    8.2<L>      23 Dec 2020 07:54    TPro  5.9<L>  /  Alb  2.5<L>  /  TBili  0.5  /  DBili  x   /  AST  24  /  ALT  22  /  AlkPhos  67  12-23    Hemoglobin A1C:   Lipid Panel 12-22 @ 21:13  Total Cholesterol, Serum 241  LDL --  Triglycerides 217    LIVER FUNCTIONS - ( 23 Dec 2020 07:54 )  Alb: 2.5 g/dL / Pro: 5.9 g/dL / ALK PHOS: 67 U/L / ALT: 22 U/L / AST: 24 U/L / GGT: x           Vitamin B12   PT/INR - ( 21 Dec 2020 21:48 )   PT: 12.6 sec;   INR: 1.04 ratio         PTT - ( 21 Dec 2020 21:48 )  PTT:42.4 sec      RADIOLOGY      ANALYSIS AND PLAN:  This is a 89-year-old with an episode of altered mental status.  For episode of altered mental status, questionable this could be any type of hypertension type of picture, history of poor sleep, suspect less likely from global generalized symptoms of confusion, feeling weak in both legs with tremors.   I would recommend to monitor systolic blood pressure.  Telemetry evaluation as needed.   MRI and MRA imaging of the brain  noted negative for CVA  For history of atrial fibrillation, continue the patient on Pradaxa and digoxin.  For high cholesterol and hyperlipidemia, continue the patient on statin.  For hypertension, monitor systolic blood pressure.  For depression, continue the patient on home psychiatric medication.  For hypothyroidism, continue the patient on Synthroid adjust as needed  Spoke with the daughter, Thais, at [phone] 12/23/2020 spoke to her to have repeat mra in 3 to 6 months   Ffrom a neurology standpoint only, the patient is okay for discharge planning.  The patient was given follow-up instructions to see an outside neurologist.    Thank you for the courtesy of this consultation.    Greater than 45 minutes of time was spent with the patient, plan of care, reviewing data, speaking to the family and  50% of the visit was spent counseling and/or coordinating care with multidisciplinary healthcare team  
[facility]  Yo Mendoza PA-C  [address] [place] [address]  [phone]      INTERVAL HPI/OVERNIGHT EVENTS:    patient s/e  no dysphagia  rodger diet     MEDICATIONS  (STANDING):  atorvastatin 10 milliGRAM(s) Oral at bedtime  cloNIDine 0.1 milliGRAM(s) Oral two times a day  dabigatran 75 milliGRAM(s) Oral every 12 hours  digoxin     Tablet 0.125 milliGRAM(s) Oral daily  diltiazem    milliGRAM(s) Oral daily  hydrALAZINE 100 milliGRAM(s) Oral every 6 hours  isosorbide   mononitrate ER Tablet (IMDUR) 60 milliGRAM(s) Oral daily  levothyroxine 100 MICROGram(s) Oral daily  losartan 100 milliGRAM(s) Oral daily  pantoprazole    Tablet 40 milliGRAM(s) Oral before breakfast  sertraline 100 milliGRAM(s) Oral daily    MEDICATIONS  (PRN):  hydrALAZINE Injectable 10 milliGRAM(s) IV Push every 4 hours PRN SBP>190 or DBP>100      Allergies    No Known Drug Allergies  shellfish (Hives)  shellfish (Unknown)    Intolerances        ROS:   General:  No wt loss, fevers, chills, night sweats, fatigue,   Eyes:  Good vision, no reported pain  ENT:  No sore throat, pain, runny nose, dysphagia  CV:  No pain, palpitations, hypo/hypertension  Resp:  No dyspnea, cough, tachypnea, wheezing  GI:  No pain, No nausea, No vomiting, No diarrhea, No constipation, No weight loss, No fever, No pruritis, No rectal bleeding, No tarry stools, No dysphagia,  :  No pain, bleeding, incontinence, nocturia  Muscle:  No pain, weakness  Neuro:  No weakness, tingling, memory problems  Psych:  No fatigue, insomnia, mood problems, depression  Endocrine:  No polyuria, polydipsia, cold/heat intolerance  Heme:  No petechiae, ecchymosis, easy bruisability  Skin:  No rash, tattoos, scars, edema      PHYSICAL EXAM:   Vital Signs:  Vital Signs Last 24 Hrs  T(C): 36.6 (23 Dec 2020 13:33), Max: 37.3 (22 Dec 2020 20:05)  T(F): 97.8 (23 Dec 2020 13:33), Max: 99.2 (22 Dec 2020 20:05)  HR: 57 (23 Dec 2020 13:33) (57 - 68)  BP: 148/71 (23 Dec 2020 13:33) (132/68 - 211/91)  BP(mean): --  RR: 18 (23 Dec 2020 13:33) (16 - 18)  SpO2: 93% (23 Dec 2020 13:33) (93% - 97%)  Daily     Daily     GENERAL:  Appears stated age, well-groomed, well-nourished, no distress  HEENT:  NC/AT,  conjunctivae clear and pink, no thyromegaly, nodules, adenopathy, no JVD, sclera -anicteric  CHEST:  Full & symmetric excursion, no increased effort, breath sounds clear  HEART:  Regular rhythm, S1, S2, no murmur/rub/S3/S4, no abdominal bruit, no edema  ABDOMEN:  Soft, non-tender, non-distended, normoactive bowel sounds,  no masses ,no hepato-splenomegaly, no signs of chronic liver disease  EXTEREMITIES:  no cyanosis,clubbing or edema  SKIN:  No rash/erythema/ecchymoses/petechiae/wounds/abscess/warm/dry  NEURO:  Alert, oriented, no asterixis, no tremor, no encephalopathy      LABS:                        12.8   9.70  )-----------( 236      ( 23 Dec 2020 07:54 )             37.9     12-23    141  |  108  |  23  ----------------------------<  91  3.6   |  27  |  1.30    Ca    8.2<L>      23 Dec 2020 07:54    TPro  5.9<L>  /  Alb  2.5<L>  /  TBili  0.5  /  DBili  x   /  AST  24  /  ALT  22  /  AlkPhos  67  12-23    PT/INR - ( 21 Dec 2020 21:48 )   PT: 12.6 sec;   INR: 1.04 ratio         PTT - ( 21 Dec 2020 21:48 )  PTT:42.4 sec      RADIOLOGY & ADDITIONAL TESTS:  
Patient is a 89y old  Male who presents with a chief complaint of confusion disorientation (23 Dec 2020 11:27)      INTERVAL HPI/OVERNIGHT EVENTS:overnight events noted    Home Medications:  atorvastatin 10 mg oral tablet: 1 tab(s) orally once a day (23 Dec 2020 10:08)  digoxin 125 mcg (0.125 mg) oral tablet: 1 tab(s) orally once a day (23 Dec 2020 10:08)  digoxin 125 mcg (0.125 mg) oral tablet: 1 tab(s) orally once a day (24 Jun 2020 22:05)  dilTIAZem 120 mg/24 hours oral capsule, extended release: 1 cap(s) orally once a day (24 Jun 2020 22:05)  dilTIAZem 120 mg/24 hours oral capsule, extended release: 1 cap(s) orally once a day (23 Dec 2020 10:08)  ferrous sulfate 325 mg (65 mg elemental iron) oral tablet: 1 tab(s) orally every other day (23 Dec 2020 10:08)  ferrous sulfate 325 mg (65 mg elemental iron) oral tablet: 1 tab(s) orally once a day (24 Jun 2020 22:05)  hydrALAZINE 50 mg oral tablet: 1 tab(s) orally 4 times a day (24 Jun 2020 22:05)  hydrALAZINE 50 mg oral tablet: 1 tab(s) orally 4 times a day (23 Dec 2020 10:08)  irbesartan 300 mg oral tablet: 1 tab(s) orally once a day (23 Dec 2020 10:08)  irbesartan 300 mg oral tablet: 1 tab(s) orally once a day (24 Jun 2020 22:05)  isosorbide mononitrate 60 mg oral tablet, extended release: 1 tab(s) orally once a day (in the morning) (24 Jun 2020 22:05)  isosorbide mononitrate 60 mg oral tablet, extended release: 1 tab(s) orally once a day (in the morning) (23 Dec 2020 10:08)  levothyroxine 100 mcg (0.1 mg) oral tablet: 1 tab(s) orally once a day (23 Dec 2020 10:08)  levothyroxine 100 mcg (0.1 mg) oral tablet: 1 tab(s) orally once a day (24 Jun 2020 22:05)  pantoprazole 40 mg oral delayed release tablet: 1 tab(s) orally once a day (24 Jun 2020 22:05)  pantoprazole 40 mg oral delayed release tablet: 1 tab(s) orally once a day (23 Dec 2020 10:08)  Pradaxa 75 mg oral capsule: 1 cap(s) orally 2 times a day (23 Dec 2020 10:08)  Pradaxa 75 mg oral capsule: 1 cap(s) orally 2 times a day (24 Jun 2020 22:05)  sertraline 100 mg oral tablet: 1 tab(s) orally once a day (23 Dec 2020 10:08)  sertraline 100 mg oral tablet: 1 tab(s) orally once a day (24 Jun 2020 22:05)  simvastatin 10 mg oral tablet: 1 tab(s) orally once a day (at bedtime) (24 Jun 2020 22:05)  Vitamin D2 2000 intl units (50 mcg) oral capsule: 1 cap(s) orally once a day (23 Dec 2020 10:08)  Vitamin D3 5000 intl units (125 mcg) oral tablet: 1 tab(s) orally once a week (Mondays) (24 Jun 2020 22:05)      MEDICATIONS  (STANDING):  atorvastatin 10 milliGRAM(s) Oral at bedtime  cloNIDine 0.1 milliGRAM(s) Oral two times a day  dabigatran 75 milliGRAM(s) Oral every 12 hours  digoxin     Tablet 0.125 milliGRAM(s) Oral daily  diltiazem    milliGRAM(s) Oral daily  hydrALAZINE 100 milliGRAM(s) Oral every 6 hours  isosorbide   mononitrate ER Tablet (IMDUR) 60 milliGRAM(s) Oral daily  levothyroxine 100 MICROGram(s) Oral daily  losartan 100 milliGRAM(s) Oral daily  pantoprazole    Tablet 40 milliGRAM(s) Oral before breakfast  sertraline 100 milliGRAM(s) Oral daily    MEDICATIONS  (PRN):  hydrALAZINE Injectable 10 milliGRAM(s) IV Push every 4 hours PRN SBP>190 or DBP>100      Allergies    No Known Drug Allergies  shellfish (Hives)  shellfish (Unknown)    Intolerances        REVIEW OF SYSTEMS:  CONSTITUTIONAL: No fever, weight loss, has fatigue  EYES: No eye pain, visual disturbances, or discharge  ENMT:  No difficulty hearing, tinnitus, vertigo; No sinus or throat pain  NECK: No pain or stiffness  BREASTS: No pain, masses, or nipple discharge  RESPIRATORY: No cough, wheezing, chills or hemoptysis; No shortness of breath  CARDIOVASCULAR: No chest pain, palpitations, dizziness, or leg swelling  GASTROINTESTINAL: No abdominal or epigastric pain. No nausea, vomiting, or hematemesis; No diarrhea or constipation. No melena or hematochezia.  GENITOURINARY: No dysuria, frequency, hematuria, or incontinence  NEUROLOGICAL: No headaches, memory loss, loss of strength, numbness, or tremors  SKIN: No itching, burning, rashes, or lesions   LYMPH NODES: No enlarged glands  ENDOCRINE: No heat or cold intolerance; No hair loss  MUSCULOSKELETAL: No joint pain or swelling; No muscle, back, or extremity pain  PSYCHIATRIC: No depression, anxiety, mood swings, or difficulty sleeping  HEME/LYMPH: No easy bruising, or bleeding gums  ALLERGY AND IMMUNOLOGIC: No hives or eczema    Vital Signs Last 24 Hrs  T(C): 36.6 (23 Dec 2020 08:18), Max: 37.3 (22 Dec 2020 20:05)  T(F): 97.9 (23 Dec 2020 08:18), Max: 99.2 (22 Dec 2020 20:05)  HR: 61 (23 Dec 2020 09:46) (59 - 68)  BP: 132/68 (23 Dec 2020 10:30) (132/68 - 211/91)  BP(mean): --  RR: 17 (23 Dec 2020 08:18) (15 - 18)  SpO2: 97% (23 Dec 2020 09:46) (93% - 97%)    PHYSICAL EXAM:  GENERAL:  well-groomed, well-developed  HEAD:  Atraumatic, Normocephalic  EYES: EOMI, PERRLA, conjunctiva and sclera clear  ENMT: Moist mucous membranes,   NECK: Supple, No JVD, Normal thyroid  NERVOUS SYSTEM:  Alert & Oriented X2, non focal  CHEST/LUNG: Clear to percussion bilaterally; No rales, rhonchi, wheezing, or rubs  HEART: Regular rate and rhythm; No murmurs, rubs, or gallops  ABDOMEN: Soft, Nontender, Nondistended; Bowel sounds present  EXTREMITIES:  2+ Peripheral Pulses, No clubbing, cyanosis, or edema  LABS:                        12.8   9.70  )-----------( 236      ( 23 Dec 2020 07:54 )             37.9     12-23    141  |  108  |  23  ----------------------------<  91  3.6   |  27  |  1.30    Ca    8.2<L>      23 Dec 2020 07:54    TPro  5.9<L>  /  Alb  2.5<L>  /  TBili  0.5  /  DBili  x   /  AST  24  /  ALT  22  /  AlkPhos  67  12-23    PT/INR - ( 21 Dec 2020 21:48 )   PT: 12.6 sec;   INR: 1.04 ratio         PTT - ( 21 Dec 2020 21:48 )  PTT:42.4 sec    CAPILLARY BLOOD GLUCOSE              I&O's Summary    22 Dec 2020 07:01  -  23 Dec 2020 07:00  --------------------------------------------------------  IN: 0 mL / OUT: 900 mL / NET: -900 mL        RADIOLOGY & ADDITIONAL TESTS:    Imaging Personally Reviewed:  [x ] YES  [ ] NO    Consultant(s) Notes Reviewed:  [x ] YES  [ ] NO    Care Discussed with Consultants/Other Providers [x ] YES  [ ] NO

## 2020-12-23 NOTE — PROGRESS NOTE ADULT - ASSESSMENT
food bolus impaction  altered mental status    above symptoms resolved  tolerating present diet  no need for  upper gastrointestinal endoscopy  no gi objection to dc planning    Advanced care planning was discussed with patient and family.  Advanced care planning forms were reviewed and discussed.  Risks, benefits and alternatives of gastroenterologic procedures were discussed in detail and all questions were answered.    30 minutes spent.  
The patient is a 89 year old male with a history of TIA, atrial fibrillation, HTN, HL, hypothyroidism, chronic diastolic heart failure who was transferred for possible TIA.    Plan:  - Symptoms may be due to hypertensive encephalopathy  - Troponin mildly elevated at 0.26 in the setting of possible HTN urgency and trended down. Low suspicion for ACS.  - Continue digoxin 0.125 mg daily  - Continue diltiazem  mg daily  - Continue hydralazine 50 mg qid  - Change hydralazine prn to IV  - Continue losartan 100 mg daily  - Continue imdur 60 mg daily  - Add clonidine 0.1 mg bid  - Continue dabigatran 75 mg bid  - Continue atorvastatin 10 mg daily  - Echo with normal LV systolic function, no significant valve issues  - Neurology follow-up
 date of service  89yo male hx of afib, CAD, CHF BIBEMS with possible tia. pt states after dinner he had a period where he could not get the words out, he had difficulty picking up his dishes, but he managed to ambulate into another room of the house, some of it was witnessed by his daughter, daughter checked his BP at home and it was over 240 systolic.  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    There is no intracranial arterial stenosis or large vessel occlusion.    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    < end of copied text >  < from: CT Head No Cont (12.21.20 @ 21:52) >  No acute intracranial hemorrhage or mass effect.   BP uncontrolled started on clonidine and hydralazine increased.  Daughter Thais wants father to be transferred to [facility] to service of DR Jb Sánchez [phone], I discussed with him and he accepted patient, transfer centre called and informed.  patient stable at discharge    Extensive chronic small vessel ischemic changes in the frontoparietal white matter and combination of dilated perivascular spaces and lacunar infarcts in the basal ganglia and thalami, unchanged.        80 minutes spent on this visit, 50% visit time spent in care co-ordination with other attendings and counselling patient  Advanced care planning discussed with patient/family. Advaced care planning forms reviewed,discussed /completed, 20 min spent  I have discussed care plan with patient and HCP,expressed understanding of problems treatment and their effect and side effects, alternatives in detail,I have asked if they have any questions and concerns and appropriately addressed them to best of my ability  Reviewed all diagonostic tests, lab results and drug drug interactions, and medications

## 2020-12-23 NOTE — DISCHARGE NOTE NURSING/CASE MANAGEMENT/SOCIAL WORK - PATIENT PORTAL LINK FT
You can access the FollowMyHealth Patient Portal offered by [facility] by registering at the following website: [email or web address]. By joining [employer]’s FollowMyHealth portal, you will also be able to view your health information using other applications (apps) compatible with our system.

## 2021-01-20 PROCEDURE — 70551 MRI BRAIN STEM W/O DYE: CPT

## 2021-01-20 PROCEDURE — 70544 MR ANGIOGRAPHY HEAD W/O DYE: CPT

## 2021-01-20 PROCEDURE — 85025 COMPLETE CBC W/AUTO DIFF WBC: CPT

## 2021-01-20 PROCEDURE — 97162 PT EVAL MOD COMPLEX 30 MIN: CPT

## 2021-01-20 PROCEDURE — 84484 ASSAY OF TROPONIN QUANT: CPT

## 2021-01-20 PROCEDURE — 85027 COMPLETE CBC AUTOMATED: CPT

## 2021-01-20 PROCEDURE — 84443 ASSAY THYROID STIM HORMONE: CPT

## 2021-01-20 PROCEDURE — 80061 LIPID PANEL: CPT

## 2021-01-20 PROCEDURE — 99285 EMERGENCY DEPT VISIT HI MDM: CPT | Mod: 25

## 2021-01-20 PROCEDURE — 93306 TTE W/DOPPLER COMPLETE: CPT

## 2021-01-20 PROCEDURE — 36415 COLL VENOUS BLD VENIPUNCTURE: CPT

## 2021-01-20 PROCEDURE — 92610 EVALUATE SWALLOWING FUNCTION: CPT

## 2021-01-20 PROCEDURE — 86769 SARS-COV-2 COVID-19 ANTIBODY: CPT

## 2021-01-20 PROCEDURE — 80053 COMPREHEN METABOLIC PANEL: CPT

## 2021-01-20 PROCEDURE — 84480 ASSAY TRIIODOTHYRONINE (T3): CPT

## 2021-01-20 PROCEDURE — 84436 ASSAY OF TOTAL THYROXINE: CPT

## 2021-01-20 PROCEDURE — 83036 HEMOGLOBIN GLYCOSYLATED A1C: CPT

## 2024-12-03 NOTE — ED PROVIDER NOTE - PRO INTERPRETER NEED 2
PT Evaluation     Today's date: 12/3/2024  Patient name: Antwan Mayers  : 1982  MRN: [id number]  Referring provider: Jordan Sharp Jr., DO  Dx:   Encounter Diagnosis     ICD-10-CM    1. Acute midline low back pain without sciatica  M54.50           Start Time: 0730  Stop Time: 0815  Total time in clinic (min): 45 minutes    Assessment  Impairments: abnormal muscle firing, abnormal or restricted ROM, abnormal movement, activity intolerance, impaired physical strength, pain with function, poor body mechanics and activity limitations    Assessment details: Patient is a 42 y.o. male presenting to direct access initial examination with chief complaint of low back pain. Signs and symptoms are consistent with low back pain with flexion preference and core stabilization TBC. No red flags or other signs of non-musculoskeletal involvement present. Primary impairments include lumbar flexion preference, hip girdle strength deficits, and core strength deficits. As a result of impairments patient experiences limitations with functional/daily activities including morning stiffness, prolonged positioning, walking dogs, and prolonged standing > 30 minutes. Educated patient regarding plan of care and answered all patient questions to patient satisfaction. Patient would benefit from skilled PT interventions to address above impairments in order to maximize functional capacity.       Prognosis: good    Goals  Impairment Goals: 4-6 weeks  - Patient to decrease pain to 0/10  - Patient to increase hip strength to 4+/5 throughout  - Patient to demonstrate proper core activation and body mechanics during functional activities    Functional Goals: by discharge  - Patient to discharge to independent HEP  - Patient to improve subjective functional level to 100%  - Patient to improve tolerance to walking dogs   - Patient to improve tolerance to prolonged standing     Plan  Patient would benefit from: skilled physical  therapy  Planned modality interventions: cryotherapy, TENS and thermotherapy: hydrocollator packs    Planned therapy interventions: flexibility, home exercise program, joint mobilization, manual therapy, neuromuscular re-education, patient education, strengthening, stretching, therapeutic activities, therapeutic exercise and functional ROM exercises    Frequency: 1x week  Duration in weeks: 6  Treatment plan discussed with: patient        Subjective Evaluation    History of Present Illness  Mechanism of injury: HISTORY OF PRESENT ILLNESS: Patient reports onset of low back pain 3-4 weeks ago with no MATT. The pain is constant and is worse toward the end of walking the dogs. No radicular pain, sensory changes, or B/B changes. Pain is localized to midline and may radiate laterally on each side. He is active and exercises daily. Fitness regimen includes spin and weightlifting. He also runs occasionally 3-6 miles.  PRIOR TREATMENT: none  AGGRAVATING FACTORS: morning stiffness, prolonged positioning, walking dogs, prolonged standing > 30 minutes  EASING FACTORS: exercise, forward bending, heat  WORK:  (sitting)  IMAGING: none  FUNCTIONAL LIMITATIONS: morning stiffness, prolonged positioning, walking dogs, and prolonged standing > 30 minutes  SUBJECTIVE FUNCTIONAL LEVEL: 95%  PATIENT GOAL: find out if there is anything I can do for this  Pain  Current pain ratin  At best pain ratin  At worst pain ratin  Location: low back          Objective     Neurological Testing     Sensation     Lumbar   Left   Intact: light touch    Right   Intact: light touch    Active Range of Motion     Lumbar   Flexion:  WFL  Extension:  with pain Restriction level: minimal  Left lateral flexion:  WFL and with pain  Right lateral flexion:  WFL and with pain  Left rotation:  WFL  Right rotation:  WFL    Passive Range of Motion   Left Hip   Flexion: WFL  External rotation (90/90): WFL  Internal rotation (90/90):  "WFL    Right Hip   Flexion: WFL  External rotation (90/90): WFL  Internal rotation (90/90): WFL    Joint Play   Joints within functional limits: T12, L1, L2, L3, L4, L5 and S1   Mechanical Assessment    Cervical      Thoracic    Lying extension: sustained positions  Pain location: no change  Pain level: produced    Lumbar    Standing flexion: repeated movements   Pain location:no change  Pain level: decreased  Standing extension: repeated movements  Pain location: no change  Pain level: produced    Strength/Myotome Testing     Left Hip   Planes of Motion   Extension: 3+  Abduction: 3+    Right Hip   Planes of Motion   Extension: 3+  Abduction: 3+    Additional Strength Details  Lumbar myotomal strength 4+/5 with no focal deficits  R SLR: 3+  L SLR: 3+    Tests     Lumbar     Left   Negative passive SLR and slump test.     Right   Negative passive SLR and slump test.     Left Pelvic Girdle/Sacrum   Positive: active SLR test.     Right Pelvic Girdle/Sacrum   Negative: active SLR test.     Left Hip   Negative RANDOLPH and FADIR.     Right Hip   Negative RANDOLPH and FADIR.              Diagnosis: low back pain with flexion preference and core stabilization TBC   Precautions: none   Primary impairments: lumbar flexion preference, hip girdle strength deficits, and core strength deficits   *asterisks by exercise = given for HEP    12/3       Manuals                                                There Ex        Rec bike        Seated flexion *  10\" x 5       SKTC *  10\" x 5        DKTC        3-way P-ball flexion                                        Neuro Re-Ed        Supine core brace        U/L bridges        Dead bug isometric        Dead bug rev marches        Dead bug leg ext        S/L hip abd        Side planks        Prone hip ext *  2\" x 15       Prone planks        Quadruped bird dogs                                Re-evaluation             Ther Act/Gait                                         Modalities           " English

## 2025-08-02 NOTE — ED PROVIDER NOTE - WET READ LAUNCH
Wound Plan:     Gluteal cleft & buttocks-MASD/Irritant contact dermatitis due to fecal, urinary or dual incontinent with scattered full thickness stage 3 PI   Gently cleanse with mild soap and water or bath wipes, pat dry. (No scrubbing/ not necessary to remove all of the ointment from skin/gently cleanse top soiled layer leaving base layer of ointment in place)   Apply thin layer of Triad Hydrophilic wound dressing ointment to buttocks, perineum and groin   Reapply 4 times a day and as needed after incontinence     While in bed patient should only be on one fitted sheet, and one chux. Please, do not use brief while patient is resting in bed. Elevate heels off the bed surface at all times. Turn and reposition at least every 2 hours.    
<---- Click to enter wet read